# Patient Record
Sex: MALE | Race: WHITE | NOT HISPANIC OR LATINO | Employment: OTHER | URBAN - METROPOLITAN AREA
[De-identification: names, ages, dates, MRNs, and addresses within clinical notes are randomized per-mention and may not be internally consistent; named-entity substitution may affect disease eponyms.]

---

## 2023-04-27 LAB
ALANINE AMINOTRANSFERASE (SGPT) (U/L) IN SER/PLAS: 20 U/L (ref 10–52)
ALBUMIN (G/DL) IN SER/PLAS: 4.3 G/DL (ref 3.4–5)
ALKALINE PHOSPHATASE (U/L) IN SER/PLAS: 85 U/L (ref 33–136)
ANION GAP IN SER/PLAS: 12 MMOL/L (ref 10–20)
ASPARTATE AMINOTRANSFERASE (SGOT) (U/L) IN SER/PLAS: 20 U/L (ref 9–39)
BILIRUBIN TOTAL (MG/DL) IN SER/PLAS: 1 MG/DL (ref 0–1.2)
CALCIUM (MG/DL) IN SER/PLAS: 9.6 MG/DL (ref 8.6–10.3)
CARBON DIOXIDE, TOTAL (MMOL/L) IN SER/PLAS: 29 MMOL/L (ref 21–32)
CHLORIDE (MMOL/L) IN SER/PLAS: 102 MMOL/L (ref 98–107)
CHOLESTEROL (MG/DL) IN SER/PLAS: 157 MG/DL (ref 0–199)
CHOLESTEROL IN HDL (MG/DL) IN SER/PLAS: 48.9 MG/DL
CHOLESTEROL/HDL RATIO: 3.2
CREATININE (MG/DL) IN SER/PLAS: 1.07 MG/DL (ref 0.5–1.3)
GFR MALE: 73 ML/MIN/1.73M2
GLUCOSE (MG/DL) IN SER/PLAS: 88 MG/DL (ref 74–99)
LDL: 84 MG/DL (ref 0–99)
POTASSIUM (MMOL/L) IN SER/PLAS: 4.7 MMOL/L (ref 3.5–5.3)
PROTEIN TOTAL: 7.3 G/DL (ref 6.4–8.2)
SODIUM (MMOL/L) IN SER/PLAS: 138 MMOL/L (ref 136–145)
TRIGLYCERIDE (MG/DL) IN SER/PLAS: 122 MG/DL (ref 0–149)
UREA NITROGEN (MG/DL) IN SER/PLAS: 17 MG/DL (ref 6–23)
VLDL: 24 MG/DL (ref 0–40)

## 2023-05-01 PROBLEM — R73.02 IGT (IMPAIRED GLUCOSE TOLERANCE): Status: ACTIVE | Noted: 2023-05-01

## 2023-05-01 PROBLEM — G47.33 OBSTRUCTIVE SLEEP APNEA, ADULT: Status: ACTIVE | Noted: 2023-05-01

## 2023-05-01 PROBLEM — F32.5 MAJOR DEPRESSIVE DISORDER WITH SINGLE EPISODE, IN FULL REMISSION (CMS-HCC): Status: ACTIVE | Noted: 2023-05-01

## 2023-05-01 PROBLEM — H81.11 BENIGN PAROXYSMAL POSITIONAL VERTIGO OF RIGHT EAR: Status: ACTIVE | Noted: 2023-05-01

## 2023-05-01 PROBLEM — I49.5 SINUS NODE DYSFUNCTION (MULTI): Status: ACTIVE | Noted: 2023-05-01

## 2023-05-01 PROBLEM — N62 SUBAREOLAR GYNECOMASTIA IN MALE: Status: ACTIVE | Noted: 2023-05-01

## 2023-05-01 PROBLEM — N21.0 CALCIUM STONE OF BLADDER: Status: ACTIVE | Noted: 2023-05-01

## 2023-05-01 PROBLEM — E78.5 DYSLIPIDEMIA, GOAL LDL BELOW 70: Status: ACTIVE | Noted: 2023-05-01

## 2023-05-01 PROBLEM — F51.04 CHRONIC INSOMNIA: Status: ACTIVE | Noted: 2023-05-01

## 2023-05-01 PROBLEM — H81.90 VESTIBULOPATHY: Status: ACTIVE | Noted: 2023-05-01

## 2023-05-01 PROBLEM — E55.9 VITAMIN D DEFICIENCY: Status: ACTIVE | Noted: 2023-05-01

## 2023-05-01 PROBLEM — R39.15 BENIGN PROSTATIC HYPERPLASIA (BPH) WITH URINARY URGENCY: Status: ACTIVE | Noted: 2023-05-01

## 2023-05-01 PROBLEM — M17.0 PRIMARY OSTEOARTHRITIS OF BOTH KNEES: Status: ACTIVE | Noted: 2023-05-01

## 2023-05-01 PROBLEM — I87.2 VENOUS INSUFFICIENCY (CHRONIC) (PERIPHERAL): Status: ACTIVE | Noted: 2023-05-01

## 2023-05-01 PROBLEM — M1A.00X0 CHRONIC GOUTY ARTHROPATHY: Status: ACTIVE | Noted: 2023-05-01

## 2023-05-01 PROBLEM — D86.9 SARCOIDOSIS: Status: ACTIVE | Noted: 2023-05-01

## 2023-05-01 PROBLEM — E87.8 DISEQUILIBRIUM SYNDROME: Status: ACTIVE | Noted: 2023-05-01

## 2023-05-01 PROBLEM — E66.9 OBESITY WITH BODY MASS INDEX 30 OR GREATER: Status: ACTIVE | Noted: 2023-05-01

## 2023-05-01 PROBLEM — H90.3 SENSORINEURAL HEARING LOSS (SNHL) OF BOTH EARS: Status: ACTIVE | Noted: 2023-05-01

## 2023-05-01 PROBLEM — G47.33 OSA ON CPAP: Status: ACTIVE | Noted: 2023-05-01

## 2023-05-01 PROBLEM — N40.1 BENIGN PROSTATIC HYPERPLASIA (BPH) WITH URINARY URGENCY: Status: ACTIVE | Noted: 2023-05-01

## 2023-05-01 PROBLEM — M1A.9XX0 CHRONIC GOUTY ARTHROPATHY: Status: ACTIVE | Noted: 2023-05-01

## 2023-05-01 PROBLEM — I48.0 PAROXYSMAL ATRIAL FIBRILLATION (MULTI): Status: ACTIVE | Noted: 2023-05-01

## 2023-05-01 PROBLEM — G25.2 ACTION TREMOR: Status: ACTIVE | Noted: 2023-05-01

## 2023-05-01 PROBLEM — G25.81 RESTLESS LEGS SYNDROME: Status: ACTIVE | Noted: 2023-05-01

## 2023-05-01 RX ORDER — PRAMIPEXOLE DIHYDROCHLORIDE 0.5 MG/1
1 TABLET ORAL NIGHTLY
COMMUNITY
Start: 2021-10-18 | End: 2023-05-02 | Stop reason: SDUPTHER

## 2023-05-01 RX ORDER — CHOLECALCIFEROL (VITAMIN D3) 25 MCG
25 TABLET ORAL DAILY
COMMUNITY

## 2023-05-01 RX ORDER — ALLOPURINOL 100 MG/1
1 TABLET ORAL DAILY
COMMUNITY
End: 2023-05-01

## 2023-05-01 RX ORDER — ATORVASTATIN CALCIUM 20 MG/1
1 TABLET, FILM COATED ORAL DAILY
COMMUNITY
Start: 2021-02-11 | End: 2023-05-02 | Stop reason: SDUPTHER

## 2023-05-01 RX ORDER — TERAZOSIN 10 MG/1
10 CAPSULE ORAL NIGHTLY
COMMUNITY
End: 2023-05-01

## 2023-05-01 RX ORDER — ESCITALOPRAM OXALATE 10 MG/1
10 TABLET ORAL DAILY
COMMUNITY
End: 2023-05-01

## 2023-05-01 RX ORDER — TRAZODONE HYDROCHLORIDE 100 MG/1
100 TABLET ORAL NIGHTLY
COMMUNITY
Start: 2020-08-19 | End: 2023-05-02 | Stop reason: SDUPTHER

## 2023-05-02 ENCOUNTER — OFFICE VISIT (OUTPATIENT)
Dept: PRIMARY CARE | Facility: CLINIC | Age: 73
End: 2023-05-02
Payer: MEDICARE

## 2023-05-02 VITALS
DIASTOLIC BLOOD PRESSURE: 70 MMHG | WEIGHT: 251 LBS | HEIGHT: 68 IN | BODY MASS INDEX: 38.04 KG/M2 | SYSTOLIC BLOOD PRESSURE: 112 MMHG | HEART RATE: 68 BPM

## 2023-05-02 DIAGNOSIS — G47.33 OSA ON CPAP: ICD-10-CM

## 2023-05-02 DIAGNOSIS — F51.04 CHRONIC INSOMNIA: ICD-10-CM

## 2023-05-02 DIAGNOSIS — H81.93 VESTIBULOPATHY OF BOTH EARS: ICD-10-CM

## 2023-05-02 DIAGNOSIS — H81.11 BENIGN PAROXYSMAL POSITIONAL VERTIGO OF RIGHT EAR: ICD-10-CM

## 2023-05-02 DIAGNOSIS — E78.5 DYSLIPIDEMIA, GOAL LDL BELOW 70: Primary | ICD-10-CM

## 2023-05-02 DIAGNOSIS — I48.0 PAROXYSMAL ATRIAL FIBRILLATION (MULTI): ICD-10-CM

## 2023-05-02 DIAGNOSIS — E66.01 CLASS 2 SEVERE OBESITY DUE TO EXCESS CALORIES WITH SERIOUS COMORBIDITY AND BODY MASS INDEX (BMI) OF 38.0 TO 38.9 IN ADULT (MULTI): ICD-10-CM

## 2023-05-02 DIAGNOSIS — I49.5 SINUS NODE DYSFUNCTION (MULTI): ICD-10-CM

## 2023-05-02 DIAGNOSIS — F32.5 MAJOR DEPRESSIVE DISORDER WITH SINGLE EPISODE, IN FULL REMISSION (CMS-HCC): ICD-10-CM

## 2023-05-02 DIAGNOSIS — G25.81 RESTLESS LEGS SYNDROME: ICD-10-CM

## 2023-05-02 DIAGNOSIS — Z12.11 COLON CANCER SCREENING: ICD-10-CM

## 2023-05-02 PROBLEM — E87.8 DISEQUILIBRIUM SYNDROME: Status: RESOLVED | Noted: 2023-05-01 | Resolved: 2023-05-02

## 2023-05-02 PROBLEM — E66.9 OBESITY WITH BODY MASS INDEX 30 OR GREATER: Status: RESOLVED | Noted: 2023-05-01 | Resolved: 2023-05-02

## 2023-05-02 PROBLEM — E66.812 CLASS 2 SEVERE OBESITY DUE TO EXCESS CALORIES WITH SERIOUS COMORBIDITY AND BODY MASS INDEX (BMI) OF 38.0 TO 38.9 IN ADULT: Status: ACTIVE | Noted: 2023-05-02

## 2023-05-02 PROCEDURE — 99215 OFFICE O/P EST HI 40 MIN: CPT | Performed by: INTERNAL MEDICINE

## 2023-05-02 PROCEDURE — 3011F LIPID PANEL DOC REV: CPT | Performed by: INTERNAL MEDICINE

## 2023-05-02 PROCEDURE — 1036F TOBACCO NON-USER: CPT | Performed by: INTERNAL MEDICINE

## 2023-05-02 PROCEDURE — 1160F RVW MEDS BY RX/DR IN RCRD: CPT | Performed by: INTERNAL MEDICINE

## 2023-05-02 PROCEDURE — 1159F MED LIST DOCD IN RCRD: CPT | Performed by: INTERNAL MEDICINE

## 2023-05-02 PROCEDURE — 3008F BODY MASS INDEX DOCD: CPT | Performed by: INTERNAL MEDICINE

## 2023-05-02 RX ORDER — FLUOXETINE HYDROCHLORIDE 40 MG/1
40 CAPSULE ORAL DAILY
Qty: 90 CAPSULE | Refills: 3 | Status: SHIPPED | OUTPATIENT
Start: 2023-05-02 | End: 2024-01-22 | Stop reason: SDUPTHER

## 2023-05-02 RX ORDER — ATORVASTATIN CALCIUM 20 MG/1
20 TABLET, FILM COATED ORAL DAILY
Qty: 90 TABLET | Refills: 3 | Status: SHIPPED | OUTPATIENT
Start: 2023-05-02 | End: 2024-01-22 | Stop reason: SDUPTHER

## 2023-05-02 RX ORDER — DULAGLUTIDE 0.75 MG/.5ML
0.75 INJECTION, SOLUTION SUBCUTANEOUS
Qty: 4 EACH | Refills: 11 | Status: SHIPPED | OUTPATIENT
Start: 2023-05-02 | End: 2024-01-22 | Stop reason: SDUPTHER

## 2023-05-02 RX ORDER — TRAZODONE HYDROCHLORIDE 100 MG/1
100 TABLET ORAL NIGHTLY
Qty: 90 TABLET | Refills: 3 | Status: SHIPPED | OUTPATIENT
Start: 2023-05-02 | End: 2024-01-22 | Stop reason: SDUPTHER

## 2023-05-02 RX ORDER — PRAMIPEXOLE DIHYDROCHLORIDE 0.5 MG/1
0.5 TABLET ORAL NIGHTLY
Qty: 90 TABLET | Refills: 3 | Status: SHIPPED | OUTPATIENT
Start: 2023-05-02 | End: 2024-01-22 | Stop reason: SDUPTHER

## 2023-05-02 ASSESSMENT — PATIENT HEALTH QUESTIONNAIRE - PHQ9
1. LITTLE INTEREST OR PLEASURE IN DOING THINGS: NOT AT ALL
SUM OF ALL RESPONSES TO PHQ9 QUESTIONS 1 AND 2: 0
2. FEELING DOWN, DEPRESSED OR HOPELESS: NOT AT ALL

## 2023-05-02 ASSESSMENT — ANXIETY QUESTIONNAIRES
4. TROUBLE RELAXING: NOT AT ALL
IF YOU CHECKED OFF ANY PROBLEMS ON THIS QUESTIONNAIRE, HOW DIFFICULT HAVE THESE PROBLEMS MADE IT FOR YOU TO DO YOUR WORK, TAKE CARE OF THINGS AT HOME, OR GET ALONG WITH OTHER PEOPLE: NOT DIFFICULT AT ALL
7. FEELING AFRAID AS IF SOMETHING AWFUL MIGHT HAPPEN: NOT AT ALL
2. NOT BEING ABLE TO STOP OR CONTROL WORRYING: NOT AT ALL
3. WORRYING TOO MUCH ABOUT DIFFERENT THINGS: NOT AT ALL
GAD7 TOTAL SCORE: 0
6. BECOMING EASILY ANNOYED OR IRRITABLE: NOT AT ALL
1. FEELING NERVOUS, ANXIOUS, OR ON EDGE: NOT AT ALL
5. BEING SO RESTLESS THAT IT IS HARD TO SIT STILL: NOT AT ALL

## 2023-05-02 ASSESSMENT — ENCOUNTER SYMPTOMS
LOSS OF SENSATION IN FEET: 0
OCCASIONAL FEELINGS OF UNSTEADINESS: 0
DEPRESSION: 0

## 2023-05-02 NOTE — ASSESSMENT & PLAN NOTE
Continue exercises for nystagmus evaluated by neuro-ophthalmologist and neuro audiologist response to vestibular rehabilitation and exercises consider reevaluation with vestibular rehab as outpatient

## 2023-05-02 NOTE — PROGRESS NOTES
"Subjective   Reason for Visit: Wesley Chilel is an 72 y.o. male here for a FU  visit.     Past Medical, Surgical, and Family History reviewed and updated in chart.    Reviewed all medications by prescribing practitioner or clinical pharmacist (such as prescriptions, OTCs, herbal therapies and supplements) and documented in the medical record.    HPI    Patient Care Team:  Hany Penaloza DO as PCP - General     Review of Systems    Objective   Vitals:  /70   Pulse 68   Ht 1.727 m (5' 8\")   Wt 114 kg (251 lb)   BMI 38.16 kg/m²       Physical Exam    Assessment/Plan   Problem List Items Addressed This Visit          Nervous    Chronic insomnia    Current Assessment & Plan     Stable on trazodone 100 mg daily         Relevant Medications    traZODone (Desyrel) 100 mg tablet    dulaglutide (Trulicity) 0.75 mg/0.5 mL pen injector    zoster vaccine-recombinant adjuvanted (Shingrix) 50 mcg/0.5 mL vaccine    Other Relevant Orders    Fecal Occult Blood Immunoassay    ANNA on CPAP    Current Assessment & Plan     Sleep test completed October 8, 2021 underwent titration at [facility] severe sleep apnea syndrome controlled with CPAP at 14 cm of water pressure also with concomitant periodic limb movement disorder treated with pramipexole 0.5 mg nightly patient adherent to regular use continue         Restless legs syndrome    Current Assessment & Plan     Continue pramipexole 0.5 mg nightly         Relevant Medications    pramipexole (Mirapex) 0.5 mg tablet    dulaglutide (Trulicity) 0.75 mg/0.5 mL pen injector    zoster vaccine-recombinant adjuvanted (Shingrix) 50 mcg/0.5 mL vaccine    Other Relevant Orders    Fecal Occult Blood Immunoassay       Circulatory    Paroxysmal atrial fibrillation (CMS/HCC)    Current Assessment & Plan     Heart rate stable normal sinus rhythm continue rivaroxaban 20 mg daily for stroke prophylaxis         Relevant Medications    rivaroxaban (Xarelto) 20 mg tablet    " dulaglutide (Trulicity) 0.75 mg/0.5 mL pen injector    zoster vaccine-recombinant adjuvanted (Shingrix) 50 mcg/0.5 mL vaccine    Other Relevant Orders    Fecal Occult Blood Immunoassay    Sinus node dysfunction (CMS/[facility])    Current Assessment & Plan     Stable at this time            Endocrine/Metabolic    Class 2 severe obesity due to excess calories with serious comorbidity and body mass index (BMI) of 38.0 to 38.9 in adult (CMS/[facility])    Current Assessment & Plan     Patient interested in GLP-1 agonist therapy for treatment of morbid obesity with stroke risk factors and comorbid illness with BMI of 38 continues to work at reduction in diet with exercise to control cardiovascular risk improvement seen in impaired fasting sugars            Other    Benign paroxysmal positional vertigo of right ear    Current Assessment & Plan     Continue exercises for nystagmus evaluated by neuro-ophthalmologist and neuro audiologist response to vestibular rehabilitation and exercises consider reevaluation with vestibular rehab as outpatient         Dyslipidemia, goal LDL below 70 - Primary    Current Assessment & Plan     Optimal lipid management on atorvastatin 20 mg daily         Relevant Medications    atorvastatin (Lipitor) 20 mg tablet    Major depressive disorder with single episode, in full remission (CMS/[facility])    Current Assessment & Plan     Good response seen with addition of fluoxetine 20 mg daily will increase to 40 mg daily for further optimization and reevaluate next visit         Relevant Medications    FLUoxetine (PROzac) 40 mg capsule    dulaglutide (Trulicity) 0.75 mg/0.5 mL pen injector    zoster vaccine-recombinant adjuvanted (Shingrix) 50 mcg/0.5 mL vaccine    Other Relevant Orders    Fecal Occult Blood Immunoassay    Vestibulopathy    Current Assessment & Plan     Continue treatment of vestibulopathy patient is unsafe to travel by train and participate in travel activities due to the severity of gait balance  and disequilibrium related to his inner ear pathology a letter was written excusing him from travel with assessment today         Colon cancer screening    Current Assessment & Plan     Fecal testing for annual screening colonoscopy completed years ago         Relevant Orders    Fecal Occult Blood Immunoassay

## 2023-05-02 NOTE — ASSESSMENT & PLAN NOTE
Patient interested in GLP-1 agonist therapy for treatment of morbid obesity with stroke risk factors and comorbid illness with BMI of 38 continues to work at reduction in diet with exercise to control cardiovascular risk improvement seen in impaired fasting sugars

## 2023-05-02 NOTE — ASSESSMENT & PLAN NOTE
Good response seen with addition of fluoxetine 20 mg daily will increase to 40 mg daily for further optimization and reevaluate next visit

## 2023-05-02 NOTE — ASSESSMENT & PLAN NOTE
Continue treatment of vestibulopathy patient is unsafe to travel by train and participate in travel activities due to the severity of gait balance and disequilibrium related to his inner ear pathology a letter was written excusing him from travel with assessment today

## 2023-05-02 NOTE — ASSESSMENT & PLAN NOTE
Sleep test completed October 8, 2021 underwent titration at [facility] severe sleep apnea syndrome controlled with CPAP at 14 cm of water pressure also with concomitant periodic limb movement disorder treated with pramipexole 0.5 mg nightly patient adherent to regular use continue

## 2023-05-02 NOTE — PROGRESS NOTES
"Subjective   Patient ID: Wesley Chilel is a 72 y.o. male who presents for Follow-up.    HPI     Review of Systems    Objective   Ht 1.727 m (5' 8\")   Wt 114 kg (251 lb)   BMI 38.16 kg/m²     Physical Exam    Assessment/Plan          "

## 2023-05-10 ENCOUNTER — TELEMEDICINE (OUTPATIENT)
Dept: PHARMACY | Facility: HOSPITAL | Age: 73
End: 2023-05-10
Payer: MEDICARE

## 2023-05-10 DIAGNOSIS — F32.5 MAJOR DEPRESSIVE DISORDER WITH SINGLE EPISODE, IN FULL REMISSION (CMS-HCC): ICD-10-CM

## 2023-05-10 DIAGNOSIS — E78.5 DYSLIPIDEMIA, GOAL LDL BELOW 70: ICD-10-CM

## 2023-05-10 DIAGNOSIS — H81.93 VESTIBULOPATHY OF BOTH EARS: ICD-10-CM

## 2023-05-10 DIAGNOSIS — E66.01 CLASS 2 SEVERE OBESITY DUE TO EXCESS CALORIES WITH SERIOUS COMORBIDITY AND BODY MASS INDEX (BMI) OF 38.0 TO 38.9 IN ADULT (MULTI): ICD-10-CM

## 2023-05-10 DIAGNOSIS — G25.81 RESTLESS LEGS SYNDROME: ICD-10-CM

## 2023-05-10 DIAGNOSIS — I49.5 SINUS NODE DYSFUNCTION (MULTI): ICD-10-CM

## 2023-05-10 DIAGNOSIS — G47.33 OSA ON CPAP: ICD-10-CM

## 2023-05-10 DIAGNOSIS — H81.11 BENIGN PAROXYSMAL POSITIONAL VERTIGO OF RIGHT EAR: ICD-10-CM

## 2023-05-10 DIAGNOSIS — I48.0 PAROXYSMAL ATRIAL FIBRILLATION (MULTI): ICD-10-CM

## 2023-05-10 DIAGNOSIS — Z12.11 COLON CANCER SCREENING: ICD-10-CM

## 2023-05-10 DIAGNOSIS — F51.04 CHRONIC INSOMNIA: ICD-10-CM

## 2023-05-10 NOTE — PROGRESS NOTES
Subjective   Patient ID: Wesley Chilel is a 72 y.o. male who presents for Obesity (Trulicity covered but $200/month; discussed  PAP program and will send paperwork. Will follow-up with patient in 1 week to review paperwork with wife. ).        Objective   There were no vitals taken for this visit.        Assessment/Plan   Diagnoses and all orders for this visit:  Dyslipidemia, goal LDL below 70  -     Follow Up In [facility] - Pharmacy  Paroxysmal atrial fibrillation (CMS/HCC)  -     Follow Up In [facility] - Pharmacy  Major depressive disorder with single episode, in full remission (CMS/[facility])  -     Follow Up In [facility] - Pharmacy  Chronic insomnia  -     Follow Up In [facility] - Pharmacy  Restless legs syndrome  -     Follow Up In [facility] - Pharmacy  Colon cancer screening  -     Follow Up In [facility] - Pharmacy  Class 2 severe obesity due to excess calories with serious comorbidity and body mass index (BMI) of 38.0 to 38.9 in adult (CMS/[facility])  -     Follow Up In [facility] - Pharmacy  ANNA on CPAP  -     Follow Up In [facility] - Pharmacy  Vestibulopathy of both ears  -     Follow Up In [facility] - Pharmacy  Benign paroxysmal positional vertigo of right ear  -     Follow Up In [facility] - Pharmacy  Sinus node dysfunction (CMS/[facility])  -     Follow Up In [facility] - Pharmacy    Plan:   Follow-up in 1 week to review  PAP paperwork.   Start Trulicity 0.75 mg weekly (in-possession hasn't started).

## 2023-05-10 NOTE — PATIENT INSTRUCTIONS
Completed in person Diabetic Education for the administration of once-weekly Trulicity:    1. Instructed patient that Trulicity must be kept refrigerated, if necessary a pen may be kept at room temperature for up to 14 days.  2. Remove the Pen from the refrigerator. Leave the base cap on until you are ready to inject.  3. Check the Pen label to make sure you have the right medicine and it has not . Additionally, ensure that the solution is not cloudy, discolored, or has particles in it.  4. Prior to administration, wash and rinse hands.   5.Next, choose your injection site (You may inject into your abdomen or thigh; Another person may give you the injection in your upper arm)  6. Change (rotate) your injection site each week. You may use the same area of your body, but be sure to choose a different injection site in that area.  7. Once administration site has been selected, use a new alcohol swab to sanitize the administration site and allow to air dry.  8. First, make sure the pen is in the locked position. While still in the locked position remove the base cap (gray cap) and dispose into a regular trash. Do not attempt to put the base cap back on, this may damage the needle.  9. Place the Clear Base flat and firmly against your skin at the injection site.  10. Press and hold the green Injection Button. You will hear a loud click. Continue holding the Clear Base firmly against your skin until you hear a second click. Do not rub the area after administration.  11. Dispose of the pen in a sharps container (Coffee can, laundry detergent bottle)  12. Injections will should be done on the same day every week, if you forget you have up to 3 days to remember to do your next dose. If less than 3 days remain before the next scheduled dose, skip the missed dose and administer the next dose on the regularly scheduled day.

## 2023-05-17 ENCOUNTER — TELEMEDICINE (OUTPATIENT)
Dept: PHARMACY | Facility: HOSPITAL | Age: 73
End: 2023-05-17
Payer: MEDICARE

## 2023-05-17 DIAGNOSIS — E66.01 CLASS 2 SEVERE OBESITY DUE TO EXCESS CALORIES WITH SERIOUS COMORBIDITY AND BODY MASS INDEX (BMI) OF 38.0 TO 38.9 IN ADULT (MULTI): ICD-10-CM

## 2023-05-17 NOTE — PROGRESS NOTES
Pharmacy Clinic Note    Wesley Chilel is a 72 y.o. male was referred to Clinical Pharmacy Team for weight management.    Referring Provider: Hany Penaloza DO    Subjective   Allergies   Allergen Reactions    Bee Venom Protein (Honey Bee) Swelling    Penicillins Swelling       [id number] - [facility] [address] [facility] [address]  [address]  [facility] [address]  Phone: [phone] Fax: [phone]      Objective     There were no vitals taken for this visit.     LAB  Lab Results   Component Value Date    BILITOT 1.0 04/27/2023    CALCIUM 9.6 04/27/2023    CO2 29 04/27/2023     04/27/2023    CREATININE 1.07 04/27/2023    GLUCOSE 88 04/27/2023    ALKPHOS 85 04/27/2023    K 4.7 04/27/2023    PROT 7.3 04/27/2023     04/27/2023    AST 20 04/27/2023    ALT 20 04/27/2023    BUN 17 04/27/2023    ANIONGAP 12 04/27/2023    MG 1.67 11/03/2020    PHOS 3.7 10/26/2021    ALBUMIN 4.3 04/27/2023    GFRMALE 73 04/27/2023     Lab Results   Component Value Date    TRIG 122 04/27/2023    CHOL 157 04/27/2023    HDL 48.9 04/27/2023     Lab Results   Component Value Date    HGBA1C 5.3 12/14/2022       Current Outpatient Medications on File Prior to Visit   Medication Sig Dispense Refill    atorvastatin (Lipitor) 20 mg tablet Take 1 tablet (20 mg) by mouth once daily. 90 tablet 3    cholecalciferol (Vitamin D-3) 25 MCG (1000 UT) tablet Take 1 tablet (25 mcg) by mouth once daily.      dulaglutide (Trulicity) 0.75 mg/0.5 mL pen injector Inject 0.75 mg under the skin 1 (one) time per week. 4 each 11    FLUoxetine (PROzac) 40 mg capsule Take 1 capsule (40 mg) by mouth once daily. 90 capsule 3    pramipexole (Mirapex) 0.5 mg tablet Take 1 tablet (0.5 mg) by mouth once daily at bedtime. 90 tablet 3    rivaroxaban (Xarelto) 20 mg tablet Take 1 tablet (20 mg) by mouth once daily in the evening. Take with meals. Take with food. 90 tablet 3    traZODone (Desyrel) 100 mg  tablet Take 1 tablet (100 mg) by mouth once daily at bedtime. 90 tablet 3     No current facility-administered medications on file prior to visit.      DRUG INTERACTIONS  - No significant drug-drug interactions exist that require change in therapy  _______________________________________________________________________  PATIENT EDUCATION/GOALS    1. Per Dr. Penaloza, patient received Trulicity 0.75 mg for off-label use for weight loss from retail pharmacy but will not be able to afford medication long term. Discussed with patient's wife that we may be able to enroll patient in  PAP for Trulicity pending updated 1040 and paid claim. Pt wife to bring 2020 1040 form to office on 5/17/23 and we will send a new prescription for Trulicity to our  pharmacy in order to receive a paid claim.    2. If  PAP approved, will also assess potential to enrol Xarelto in  PAP in 2.5 months (90 day refill just picked up).  _______________________________________________________________________    Assessment/Plan   Problem List Items Addressed This Visit          Endocrine/Metabolic    Class 2 severe obesity due to excess calories with serious comorbidity and body mass index (BMI) of 38.0 to 38.9 in adult (CMS/[facility])        Continue all meds under the continuation of care with the referring provider and clinical pharmacy team.    Clinical Pharmacist follow-up: 5/24/23    Hai Farr, PharmFAITH     Verbal consent to manage patient's drug therapy was obtained from [the patient and/or an individual authorized to act on behalf of a patient]. They were informed they may decline to participate or withdraw from participation in pharmacy services at any time.

## 2023-05-30 ENCOUNTER — TELEPHONE (OUTPATIENT)
Dept: PRIMARY CARE | Facility: CLINIC | Age: 73
End: 2023-05-30

## 2023-05-30 NOTE — TELEPHONE ENCOUNTER
Patient had an order for a Fecal Occult Blood and it . He wants to know if new order can be put in and does he need to come into office to pick anything up for this. Thanks!

## 2023-06-05 DIAGNOSIS — Z12.11 COLON CANCER SCREENING: Primary | ICD-10-CM

## 2023-06-06 ENCOUNTER — LAB (OUTPATIENT)
Dept: LAB | Facility: LAB | Age: 73
End: 2023-06-06
Payer: MEDICARE

## 2023-06-06 DIAGNOSIS — Z12.11 COLON CANCER SCREENING: ICD-10-CM

## 2023-06-06 LAB — OCCULT BLOOD, STOOL X1: NORMAL

## 2023-06-06 PROCEDURE — 82274 ASSAY TEST FOR BLOOD FECAL: CPT

## 2023-06-09 LAB — FECAL OCCULT BLD IMMUNOASSAY: NEGATIVE

## 2023-11-03 ENCOUNTER — APPOINTMENT (OUTPATIENT)
Dept: PRIMARY CARE | Facility: CLINIC | Age: 73
End: 2023-11-03
Payer: MEDICARE

## 2023-12-05 DIAGNOSIS — I48.0 PAROXYSMAL ATRIAL FIBRILLATION (MULTI): ICD-10-CM

## 2023-12-05 DIAGNOSIS — E78.5 DYSLIPIDEMIA, GOAL LDL BELOW 70: ICD-10-CM

## 2023-12-05 DIAGNOSIS — E55.9 VITAMIN D DEFICIENCY: ICD-10-CM

## 2023-12-05 DIAGNOSIS — M1A.00X0 CHRONIC GOUTY ARTHROPATHY: Primary | ICD-10-CM

## 2023-12-05 DIAGNOSIS — G25.81 RESTLESS LEGS SYNDROME: ICD-10-CM

## 2023-12-05 DIAGNOSIS — F32.5 MAJOR DEPRESSIVE DISORDER WITH SINGLE EPISODE, IN FULL REMISSION (CMS-HCC): ICD-10-CM

## 2023-12-05 DIAGNOSIS — R73.02 IGT (IMPAIRED GLUCOSE TOLERANCE): ICD-10-CM

## 2024-01-17 ENCOUNTER — LAB (OUTPATIENT)
Dept: LAB | Facility: LAB | Age: 74
End: 2024-01-17
Payer: MEDICARE

## 2024-01-17 DIAGNOSIS — I48.0 PAROXYSMAL ATRIAL FIBRILLATION (MULTI): ICD-10-CM

## 2024-01-17 DIAGNOSIS — M1A.00X0 CHRONIC GOUTY ARTHROPATHY: ICD-10-CM

## 2024-01-17 DIAGNOSIS — E55.9 VITAMIN D DEFICIENCY: ICD-10-CM

## 2024-01-17 DIAGNOSIS — R73.02 IGT (IMPAIRED GLUCOSE TOLERANCE): ICD-10-CM

## 2024-01-17 DIAGNOSIS — F32.5 MAJOR DEPRESSIVE DISORDER WITH SINGLE EPISODE, IN FULL REMISSION (CMS-HCC): ICD-10-CM

## 2024-01-17 DIAGNOSIS — E78.5 DYSLIPIDEMIA, GOAL LDL BELOW 70: ICD-10-CM

## 2024-01-17 DIAGNOSIS — G25.81 RESTLESS LEGS SYNDROME: ICD-10-CM

## 2024-01-17 LAB
25(OH)D3 SERPL-MCNC: 28 NG/ML (ref 30–100)
ALBUMIN SERPL BCP-MCNC: 4 G/DL (ref 3.4–5)
ALP SERPL-CCNC: 71 U/L (ref 33–136)
ALT SERPL W P-5'-P-CCNC: 20 U/L (ref 10–52)
ANION GAP SERPL CALC-SCNC: 11 MMOL/L (ref 10–20)
AST SERPL W P-5'-P-CCNC: 20 U/L (ref 9–39)
BASOPHILS # BLD AUTO: 0.02 X10*3/UL (ref 0–0.1)
BASOPHILS NFR BLD AUTO: 0.3 %
BILIRUB SERPL-MCNC: 0.7 MG/DL (ref 0–1.2)
BUN SERPL-MCNC: 22 MG/DL (ref 6–23)
CALCIUM SERPL-MCNC: 8.8 MG/DL (ref 8.6–10.3)
CHLORIDE SERPL-SCNC: 105 MMOL/L (ref 98–107)
CHOLEST SERPL-MCNC: 179 MG/DL (ref 0–199)
CHOLESTEROL/HDL RATIO: 4
CO2 SERPL-SCNC: 27 MMOL/L (ref 21–32)
CREAT SERPL-MCNC: 1.1 MG/DL (ref 0.5–1.3)
EGFRCR SERPLBLD CKD-EPI 2021: 70 ML/MIN/1.73M*2
EOSINOPHIL # BLD AUTO: 0.17 X10*3/UL (ref 0–0.4)
EOSINOPHIL NFR BLD AUTO: 2.7 %
ERYTHROCYTE [DISTWIDTH] IN BLOOD BY AUTOMATED COUNT: 12.6 % (ref 11.5–14.5)
EST. AVERAGE GLUCOSE BLD GHB EST-MCNC: 100 MG/DL
GLUCOSE SERPL-MCNC: 90 MG/DL (ref 74–99)
HBA1C MFR BLD: 5.1 %
HCT VFR BLD AUTO: 46.1 % (ref 41–52)
HCV AB SER QL: NONREACTIVE
HDLC SERPL-MCNC: 44.3 MG/DL
HGB BLD-MCNC: 15.4 G/DL (ref 13.5–17.5)
IMM GRANULOCYTES # BLD AUTO: 0.03 X10*3/UL (ref 0–0.5)
IMM GRANULOCYTES NFR BLD AUTO: 0.5 % (ref 0–0.9)
LDLC SERPL CALC-MCNC: 99 MG/DL
LYMPHOCYTES # BLD AUTO: 1.64 X10*3/UL (ref 0.8–3)
LYMPHOCYTES NFR BLD AUTO: 26.4 %
MCH RBC QN AUTO: 29.8 PG (ref 26–34)
MCHC RBC AUTO-ENTMCNC: 33.4 G/DL (ref 32–36)
MCV RBC AUTO: 89 FL (ref 80–100)
MONOCYTES # BLD AUTO: 0.54 X10*3/UL (ref 0.05–0.8)
MONOCYTES NFR BLD AUTO: 8.7 %
NEUTROPHILS # BLD AUTO: 3.81 X10*3/UL (ref 1.6–5.5)
NEUTROPHILS NFR BLD AUTO: 61.4 %
NON HDL CHOLESTEROL: 135 MG/DL (ref 0–149)
NRBC BLD-RTO: 0 /100 WBCS (ref 0–0)
PLATELET # BLD AUTO: 194 X10*3/UL (ref 150–450)
POTASSIUM SERPL-SCNC: 4.4 MMOL/L (ref 3.5–5.3)
PROT SERPL-MCNC: 6.3 G/DL (ref 6.4–8.2)
RBC # BLD AUTO: 5.17 X10*6/UL (ref 4.5–5.9)
SODIUM SERPL-SCNC: 139 MMOL/L (ref 136–145)
TRIGL SERPL-MCNC: 177 MG/DL (ref 0–149)
URATE SERPL-MCNC: 6 MG/DL (ref 4–7.5)
VLDL: 35 MG/DL (ref 0–40)
WBC # BLD AUTO: 6.2 X10*3/UL (ref 4.4–11.3)

## 2024-01-17 PROCEDURE — 36415 COLL VENOUS BLD VENIPUNCTURE: CPT

## 2024-01-17 PROCEDURE — 83036 HEMOGLOBIN GLYCOSYLATED A1C: CPT

## 2024-01-17 PROCEDURE — 82306 VITAMIN D 25 HYDROXY: CPT

## 2024-01-17 PROCEDURE — 80061 LIPID PANEL: CPT

## 2024-01-17 PROCEDURE — 85025 COMPLETE CBC W/AUTO DIFF WBC: CPT

## 2024-01-17 PROCEDURE — 80053 COMPREHEN METABOLIC PANEL: CPT

## 2024-01-17 PROCEDURE — 84550 ASSAY OF BLOOD/URIC ACID: CPT

## 2024-01-17 PROCEDURE — 86803 HEPATITIS C AB TEST: CPT

## 2024-01-22 ENCOUNTER — OFFICE VISIT (OUTPATIENT)
Dept: PRIMARY CARE | Facility: CLINIC | Age: 74
End: 2024-01-22
Payer: MEDICARE

## 2024-01-22 VITALS
BODY MASS INDEX: 36.37 KG/M2 | SYSTOLIC BLOOD PRESSURE: 120 MMHG | HEIGHT: 68 IN | HEART RATE: 68 BPM | WEIGHT: 240 LBS | DIASTOLIC BLOOD PRESSURE: 78 MMHG

## 2024-01-22 DIAGNOSIS — I48.0 PAROXYSMAL ATRIAL FIBRILLATION (MULTI): ICD-10-CM

## 2024-01-22 DIAGNOSIS — G47.33 OSA ON CPAP: ICD-10-CM

## 2024-01-22 DIAGNOSIS — F32.5 MAJOR DEPRESSIVE DISORDER WITH SINGLE EPISODE, IN FULL REMISSION (CMS-HCC): ICD-10-CM

## 2024-01-22 DIAGNOSIS — F51.04 CHRONIC INSOMNIA: ICD-10-CM

## 2024-01-22 DIAGNOSIS — E78.5 DYSLIPIDEMIA, GOAL LDL BELOW 70: ICD-10-CM

## 2024-01-22 DIAGNOSIS — R73.02 IGT (IMPAIRED GLUCOSE TOLERANCE): ICD-10-CM

## 2024-01-22 DIAGNOSIS — Z00.00 MEDICARE ANNUAL WELLNESS VISIT, SUBSEQUENT: Primary | ICD-10-CM

## 2024-01-22 DIAGNOSIS — I49.5 SINUS NODE DYSFUNCTION (MULTI): ICD-10-CM

## 2024-01-22 DIAGNOSIS — E55.9 VITAMIN D DEFICIENCY: ICD-10-CM

## 2024-01-22 DIAGNOSIS — Z12.11 COLON CANCER SCREENING: ICD-10-CM

## 2024-01-22 DIAGNOSIS — E66.01 CLASS 2 SEVERE OBESITY DUE TO EXCESS CALORIES WITH SERIOUS COMORBIDITY AND BODY MASS INDEX (BMI) OF 36.0 TO 36.9 IN ADULT (MULTI): ICD-10-CM

## 2024-01-22 DIAGNOSIS — G25.81 RESTLESS LEGS SYNDROME: ICD-10-CM

## 2024-01-22 PROBLEM — H81.90 VESTIBULOPATHY: Status: RESOLVED | Noted: 2023-05-01 | Resolved: 2024-01-22

## 2024-01-22 PROCEDURE — 1159F MED LIST DOCD IN RCRD: CPT | Performed by: INTERNAL MEDICINE

## 2024-01-22 PROCEDURE — 1160F RVW MEDS BY RX/DR IN RCRD: CPT | Performed by: INTERNAL MEDICINE

## 2024-01-22 PROCEDURE — G0442 ANNUAL ALCOHOL SCREEN 15 MIN: HCPCS | Performed by: INTERNAL MEDICINE

## 2024-01-22 PROCEDURE — 99214 OFFICE O/P EST MOD 30 MIN: CPT | Performed by: INTERNAL MEDICINE

## 2024-01-22 PROCEDURE — G0447 BEHAVIOR COUNSEL OBESITY 15M: HCPCS | Performed by: INTERNAL MEDICINE

## 2024-01-22 PROCEDURE — 99497 ADVNCD CARE PLAN 30 MIN: CPT | Performed by: INTERNAL MEDICINE

## 2024-01-22 PROCEDURE — G0444 DEPRESSION SCREEN ANNUAL: HCPCS | Performed by: INTERNAL MEDICINE

## 2024-01-22 PROCEDURE — 3008F BODY MASS INDEX DOCD: CPT | Performed by: INTERNAL MEDICINE

## 2024-01-22 PROCEDURE — G0439 PPPS, SUBSEQ VISIT: HCPCS | Performed by: INTERNAL MEDICINE

## 2024-01-22 PROCEDURE — 1036F TOBACCO NON-USER: CPT | Performed by: INTERNAL MEDICINE

## 2024-01-22 PROCEDURE — G0446 INTENS BEHAVE THER CARDIO DX: HCPCS | Performed by: INTERNAL MEDICINE

## 2024-01-22 PROCEDURE — 1170F FXNL STATUS ASSESSED: CPT | Performed by: INTERNAL MEDICINE

## 2024-01-22 RX ORDER — DULAGLUTIDE 0.75 MG/.5ML
0.75 INJECTION, SOLUTION SUBCUTANEOUS
Qty: 4 EACH | Refills: 11 | Status: SHIPPED | OUTPATIENT
Start: 2024-01-22 | End: 2024-01-22 | Stop reason: WASHOUT

## 2024-01-22 RX ORDER — PRAMIPEXOLE DIHYDROCHLORIDE 0.5 MG/1
0.5 TABLET ORAL NIGHTLY
Qty: 90 TABLET | Refills: 3 | Status: SHIPPED | OUTPATIENT
Start: 2024-01-22

## 2024-01-22 RX ORDER — DOXEPIN HYDROCHLORIDE 10 MG/1
10 CAPSULE ORAL NIGHTLY
Qty: 90 CAPSULE | Refills: 3 | Status: SHIPPED | OUTPATIENT
Start: 2024-01-22 | End: 2025-01-21

## 2024-01-22 RX ORDER — SEMAGLUTIDE 0.25 MG/.5ML
0.25 INJECTION, SOLUTION SUBCUTANEOUS
Qty: 2 ML | Refills: 11 | Status: SHIPPED | OUTPATIENT
Start: 2024-01-22 | End: 2024-02-01

## 2024-01-22 RX ORDER — TRAZODONE HYDROCHLORIDE 100 MG/1
100 TABLET ORAL NIGHTLY
Qty: 90 TABLET | Refills: 3 | Status: SHIPPED | OUTPATIENT
Start: 2024-01-22 | End: 2024-01-22 | Stop reason: ALTCHOICE

## 2024-01-22 RX ORDER — ATORVASTATIN CALCIUM 20 MG/1
20 TABLET, FILM COATED ORAL DAILY
Qty: 90 TABLET | Refills: 3 | Status: SHIPPED | OUTPATIENT
Start: 2024-01-22 | End: 2024-05-06 | Stop reason: SDUPTHER

## 2024-01-22 RX ORDER — FLUOXETINE HYDROCHLORIDE 40 MG/1
40 CAPSULE ORAL DAILY
Qty: 90 CAPSULE | Refills: 3 | Status: SHIPPED | OUTPATIENT
Start: 2024-01-22 | End: 2025-01-21

## 2024-01-22 RX ORDER — TRAZODONE HYDROCHLORIDE 150 MG/1
150 TABLET ORAL NIGHTLY
Qty: 90 TABLET | Refills: 3 | Status: SHIPPED | OUTPATIENT
Start: 2024-01-22 | End: 2025-01-21

## 2024-01-22 ASSESSMENT — ANXIETY QUESTIONNAIRES
6. BECOMING EASILY ANNOYED OR IRRITABLE: NOT AT ALL
3. WORRYING TOO MUCH ABOUT DIFFERENT THINGS: NOT AT ALL
GAD7 TOTAL SCORE: 0
1. FEELING NERVOUS, ANXIOUS, OR ON EDGE: NOT AT ALL
7. FEELING AFRAID AS IF SOMETHING AWFUL MIGHT HAPPEN: NOT AT ALL
4. TROUBLE RELAXING: NOT AT ALL
IF YOU CHECKED OFF ANY PROBLEMS ON THIS QUESTIONNAIRE, HOW DIFFICULT HAVE THESE PROBLEMS MADE IT FOR YOU TO DO YOUR WORK, TAKE CARE OF THINGS AT HOME, OR GET ALONG WITH OTHER PEOPLE: NOT DIFFICULT AT ALL
5. BEING SO RESTLESS THAT IT IS HARD TO SIT STILL: NOT AT ALL
2. NOT BEING ABLE TO STOP OR CONTROL WORRYING: NOT AT ALL

## 2024-01-22 ASSESSMENT — ACTIVITIES OF DAILY LIVING (ADL)
BATHING: INDEPENDENT
TAKING_MEDICATION: INDEPENDENT
DRESSING: INDEPENDENT
MANAGING_FINANCES: INDEPENDENT
GROCERY_SHOPPING: INDEPENDENT
DOING_HOUSEWORK: INDEPENDENT

## 2024-01-22 ASSESSMENT — ENCOUNTER SYMPTOMS
LOSS OF SENSATION IN FEET: 0
OCCASIONAL FEELINGS OF UNSTEADINESS: 0
DEPRESSION: 0

## 2024-01-22 NOTE — PROGRESS NOTES
"Subjective   Reason for Visit: Wesley Chilel is an 73 y.o. male here for a Medicare Wellness visit.          Reviewed all medications by prescribing practitioner or clinical pharmacist (such as prescriptions, OTCs, herbal therapies and supplements) and documented in the medical record.    HPI    Patient Care Team:  Hany Penaloza DO as PCP - General  Hany Penaloza DO as PCP [facility]     Review of Systems    Objective   Vitals:  Ht 1.727 m (5' 8\")   Wt 109 kg (240 lb)   BMI 36.49 kg/m²       Physical Exam    Assessment/Plan   Problem List Items Addressed This Visit       Benign paroxysmal positional vertigo of right ear    Chronic insomnia    Dyslipidemia, goal LDL below 70    Major depressive disorder with single episode, in full remission (CMS/HCC)    ANNA on CPAP    Paroxysmal atrial fibrillation (CMS/HCC)    Restless legs syndrome    Sinus node dysfunction (CMS/HCC)    Vestibulopathy    Class 2 severe obesity due to excess calories with serious comorbidity and body mass index (BMI) of 38.0 to 38.9 in adult (CMS/HCC)    Colon cancer screening          "

## 2024-01-22 NOTE — ASSESSMENT & PLAN NOTE
Difficulty in getting to sleep chronic will increase trazodone from 100 to 150 mg nightly add doxepin 10 mg nightly consider adding gabapentin encourage patient to discontinue Advil PM and melatonin of no benefit reevaluate next visit

## 2024-01-22 NOTE — ASSESSMENT & PLAN NOTE
Improvement in BMI from 38-36 on Trulicity would like to switch to Wegovy will consult pharmacy for titration reevaluate in 6 months continues with intermittent fasting doing well

## 2024-01-22 NOTE — ASSESSMENT & PLAN NOTE
LDL cholesterol optimal at 99 HDL 44 triglycerides mildly elevated 177 patient to initiate Wegovy continue atorvastatin 20 mg daily

## 2024-01-22 NOTE — PROGRESS NOTES
Alcohol consumption becomes hazardous when consuming women oh over 65 years old greater than 7 drinks per week or greater than 3 drinks per occasion for men greater than 14 drinks per week or greater than 4 drinks per occasion.  I spent 15 minutes screening for alcohol use.Depression and anxiety screening completed   PHQ9 score   GAD7 score   I spent 15 minutes obtaining and discussing depression screening using PHQ 2 questions with results documented in chart.  Screening using PHQ-9 and KEYA-7 scores were used for follow-up with treatment and referral plan discussed.      I spent greater than 15 minutes face-to-face with individual providing recommendations for nutrition choices and exercise plan to help achieve weight reductionI spent 15 minutes face-to-face with this individual discussing the cardiovascular risk and behavioral therapies of nutritional choices exercise and elimination of habits contributing to risk .We agreed on a plan how they may be able to reduce  current cardiovascular risk.  Per patient with calculation greater than 10% aspirin use was discussed and encouraged unless known allergy or increased risk of bleeding contraindicates use patient's 10-year CV risk estimate calculates:I spent greater than 15 minutes discussing advance care planning including the explanation and discussion of advanced directives.  If patient does not have current up-to-date documents examples and information provided on how to create both living will and power of . [facility] toolkit was given to patient and was encouraged to work out completing these documents.Subjective   Reason for Visit: Wesley Chilel is an 73 y.o. male here for a Medicare Wellness visit.     Past Medical, Surgical, and Family History reviewed and updated in chart.    Reviewed all medications by prescribing practitioner or clinical pharmacist (such as prescriptions, OTCs, herbal therapies and supplements) and documented in the medical  "record.    HPI    Patient Care Team:  Hany Penaloza DO as PCP - General  [facility]CP [facility]     Review of Systems    Objective   Vitals:  /78   Pulse 68   Ht 1.727 m (5' 8\")   Wt 109 kg (240 lb)   BMI 36.49 kg/m²       Physical Exam    Assessment/Plan   Problem List Items Addressed This Visit       Chronic insomnia    Current Assessment & Plan     Difficulty in getting to sleep chronic will increase trazodone from 100 to 150 mg nightly add doxepin 10 mg nightly consider adding gabapentin encourage patient to discontinue Advil PM and melatonin of no benefit reevaluate next visit         Relevant Medications    doxepin (SINEquan) 10 mg capsule    traZODone (Desyrel) 150 mg tablet    Other Relevant Orders    Follow Up In [facility] - Pharmacy    Dyslipidemia, goal LDL below 70    Current Assessment & Plan     LDL cholesterol optimal at 99 HDL 44 triglycerides mildly elevated 177 patient to initiate Wegovy continue atorvastatin 20 mg daily         Relevant Medications    atorvastatin (Lipitor) 20 mg tablet    Other Relevant Orders    Follow Up In [facility] - PCP - Established    Comprehensive Metabolic Panel    Hemoglobin A1C    Lipid Panel    Albumin , Urine Random    Vitamin D 25-Hydroxy,Total (for eval of Vitamin D levels)    Fecal Occult Blood Immunoassy    IGT (impaired glucose tolerance)    Current Assessment & Plan     Significant improvement in impaired fasting sugars fasting blood sugar of 90 with a A1c of 5.1 with GLP-1 agonist therapy continue         Relevant Orders    Comprehensive Metabolic Panel    Hemoglobin A1C    Lipid Panel    Albumin , Urine Random    Vitamin D 25-Hydroxy,Total (for eval of Vitamin D levels)    Fecal Occult Blood Immunoassy    Major depressive disorder with single episode, in full remission (CMS/[facility])    Current Assessment & Plan     Continue fluoxetine 40 mg daily in remission         Relevant " Medications    FLUoxetine (PROzac) 40 mg capsule    Other Relevant Orders    Follow Up In [facility] - PCP - Established    ANNA on CPAP    Current Assessment & Plan     Compliant with regular use of CPAP         Relevant Orders    Follow Up In [facility] - PCP - Established    Paroxysmal atrial fibrillation (CMS/[facility])    Current Assessment & Plan     Rate and rhythm controlled continue Xarelto for prevention of stroke         Relevant Medications    rivaroxaban (Xarelto) 20 mg tablet    Other Relevant Orders    Follow Up In [facility] - PCP - Established    Restless legs syndrome    Current Assessment & Plan     Continue pramipexole stable         Relevant Medications    pramipexole (Mirapex) 0.5 mg tablet    Other Relevant Orders    Follow Up In [facility] - PCP - Established    Sinus node dysfunction (CMS/[facility])    Current Assessment & Plan     Stable at this time no reports of symptoms with heart rate         Relevant Orders    Follow Up In [facility] - PCP - Established    Vitamin D deficiency    Current Assessment & Plan     Increase vitamin D supplementation reevaluate next visit         Relevant Orders    Vitamin D 25-Hydroxy,Total (for eval of Vitamin D levels)    Class 2 severe obesity due to excess calories with serious comorbidity and body mass index (BMI) of 36.0 to 36.9 in adult (CMS/[facility])    Current Assessment & Plan     Improvement in BMI from 38-36 on Trulicity would like to switch to Wegovy will consult pharmacy for titration reevaluate in 6 months continues with intermittent fasting doing well         Relevant Medications    semaglutide, weight loss, (Wegovy) 0.25 mg/0.5 mL pen injector    traZODone (Desyrel) 150 mg tablet    Colon cancer screening    Current Assessment & Plan     FIT testing for next visit         Relevant Orders    Fecal Occult Blood Immunoassy    Medicare annual wellness visit, subsequent - Primary    Current Assessment & Plan      Discussed advanced medical directives and fall risk as placed railings in bathroom to assist no recent falls doing well         Relevant Orders    Follow Up In Advanced [facility] Care - PCP - Established

## 2024-01-22 NOTE — ASSESSMENT & PLAN NOTE
Significant improvement in impaired fasting sugars fasting blood sugar of 90 with a A1c of 5.1 with GLP-1 agonist therapy continue

## 2024-01-22 NOTE — ASSESSMENT & PLAN NOTE
Discussed advanced medical directives and fall risk as placed railings in bathroom to assist no recent falls doing well

## 2024-02-01 ENCOUNTER — TELEMEDICINE (OUTPATIENT)
Dept: PHARMACY | Facility: HOSPITAL | Age: 74
End: 2024-02-01
Payer: MEDICARE

## 2024-02-01 DIAGNOSIS — F51.04 CHRONIC INSOMNIA: ICD-10-CM

## 2024-02-01 DIAGNOSIS — E66.01 CLASS 2 SEVERE OBESITY DUE TO EXCESS CALORIES WITH SERIOUS COMORBIDITY AND BODY MASS INDEX (BMI) OF 38.0 TO 38.9 IN ADULT (MULTI): Primary | ICD-10-CM

## 2024-02-01 DIAGNOSIS — E66.01 CLASS 2 SEVERE OBESITY DUE TO EXCESS CALORIES WITH SERIOUS COMORBIDITY AND BODY MASS INDEX (BMI) OF 36.0 TO 36.9 IN ADULT (MULTI): ICD-10-CM

## 2024-02-01 RX ORDER — DULAGLUTIDE 1.5 MG/.5ML
1.5 INJECTION, SOLUTION SUBCUTANEOUS
Qty: 2 ML | Refills: 0 | Status: SHIPPED | OUTPATIENT
Start: 2024-02-01

## 2024-02-01 NOTE — Clinical Note
Wegovy not covered via Medicare. Will restart Trulicity at 1.5 mg and increase monthly (if it is still covered).

## 2024-02-01 NOTE — PROGRESS NOTES
Pharmacy Clinic Note    Wesley Chilel is a 73 y.o. male was referred to Clinical Pharmacy Team for weight management.    Referring Provider: Hany Penaloza DO    Subjective   Allergies   Allergen Reactions    Bee Venom Protein (Honey Bee) Swelling    Penicillins Swelling       [id number] - [facility] [address] AT [facility] [address]  [address]  [facility] [address]  Phone: [phone] Fax: [phone]      Objective     There were no vitals taken for this visit.     LAB  Lab Results   Component Value Date    BILITOT 0.7 01/17/2024    CALCIUM 8.8 01/17/2024    CO2 27 01/17/2024     01/17/2024    CREATININE 1.10 01/17/2024    GLUCOSE 90 01/17/2024    ALKPHOS 71 01/17/2024    K 4.4 01/17/2024    PROT 6.3 (L) 01/17/2024     01/17/2024    AST 20 01/17/2024    ALT 20 01/17/2024    BUN 22 01/17/2024    ANIONGAP 11 01/17/2024    MG 1.67 11/03/2020    PHOS 3.7 10/26/2021    ALBUMIN 4.0 01/17/2024    GFRMALE 73 04/27/2023     Lab Results   Component Value Date    TRIG 177 (H) 01/17/2024    CHOL 179 01/17/2024    LDLCALC 99 01/17/2024    HDL 44.3 01/17/2024     Lab Results   Component Value Date    HGBA1C 5.1 01/17/2024       Current Outpatient Medications on File Prior to Visit   Medication Sig Dispense Refill    atorvastatin (Lipitor) 20 mg tablet Take 1 tablet (20 mg) by mouth once daily. 90 tablet 3    cholecalciferol (Vitamin D-3) 25 MCG (1000 UT) tablet Take 1 tablet (25 mcg) by mouth once daily.      doxepin (SINEquan) 10 mg capsule Take 1 capsule (10 mg) by mouth once daily at bedtime. 90 capsule 3    FLUoxetine (PROzac) 40 mg capsule Take 1 capsule (40 mg) by mouth once daily. 90 capsule 3    pramipexole (Mirapex) 0.5 mg tablet Take 1 tablet (0.5 mg) by mouth once daily at bedtime. 90 tablet 3    rivaroxaban (Xarelto) 20 mg tablet Take 1 tablet (20 mg) by mouth once daily in the evening. Take with meals. Take with food. 90 tablet 3    traZODone  (Desyrel) 150 mg tablet Take 1 tablet (150 mg) by mouth once daily at bedtime. 90 tablet 3    [DISCONTINUED] semaglutide, weight loss, (Wegovy) 0.25 mg/0.5 mL pen injector Inject 0.25 mg under the skin 1 (one) time per week. 2 mL 11     No current facility-administered medications on file prior to visit.      DRUG INTERACTIONS  - No significant drug-drug interactions exist that require change in therapy  _______________________________________________________________________  PATIENT EDUCATION/GOALS    1. Per Dr. Penaloza, patient received Trulicity 0.75 mg for off-label use for weight loss from retail pharmacy for the last 6-8 months and has been tolerating it well. Dr. Penaloza wanted to switch to Wegovy, however, Wegovy is not covered. Therefore, we will continue with Trulicity (increased to 1.5 mg weekly) for as long as the insurance covers it and increase it monthly as tolerated.  _______________________________________________________________________    Assessment/Plan   Problem List Items Addressed This Visit       Chronic insomnia    Class 2 severe obesity due to excess calories with serious comorbidity and body mass index (BMI) of 36.0 to 36.9 in adult (CMS/[facility])     Start Trulicity 1.5 mg once weekly.         Relevant Medications    dulaglutide (Trulicity) 1.5 mg/0.5 mL pen injector injection     Other Visit Diagnoses       Class 2 severe obesity due to excess calories with serious comorbidity and body mass index (BMI) of 38.0 to 38.9 in adult (CMS/[facility])    -  Primary    Relevant Medications    dulaglutide (Trulicity) 1.5 mg/0.5 mL pen injector injection             Continue all meds under the continuation of care with the referring provider and clinical pharmacy team.    Clinical Pharmacist follow-up: monthly    Hai Farr, PharmD     Verbal consent to manage patient's drug therapy was obtained from [the patient and/or an individual authorized to act on behalf of a patient]. They were informed they may  decline to participate or withdraw from participation in pharmacy services at any time.

## 2024-05-06 ENCOUNTER — HOSPITAL ENCOUNTER (OUTPATIENT)
Dept: VASCULAR MEDICINE | Facility: HOSPITAL | Age: 74
Discharge: HOME | End: 2024-05-06
Payer: MEDICARE

## 2024-05-06 ENCOUNTER — OFFICE VISIT (OUTPATIENT)
Dept: CARDIOLOGY | Facility: HOSPITAL | Age: 74
End: 2024-05-06
Payer: MEDICARE

## 2024-05-06 VITALS
OXYGEN SATURATION: 97 % | WEIGHT: 254.41 LBS | HEART RATE: 65 BPM | DIASTOLIC BLOOD PRESSURE: 77 MMHG | BODY MASS INDEX: 38.68 KG/M2 | SYSTOLIC BLOOD PRESSURE: 145 MMHG

## 2024-05-06 DIAGNOSIS — R09.89 BILATERAL CAROTID BRUITS: ICD-10-CM

## 2024-05-06 DIAGNOSIS — I48.0 PAROXYSMAL ATRIAL FIBRILLATION (MULTI): Primary | ICD-10-CM

## 2024-05-06 DIAGNOSIS — E78.5 DYSLIPIDEMIA, GOAL LDL BELOW 70: ICD-10-CM

## 2024-05-06 PROCEDURE — 93005 ELECTROCARDIOGRAM TRACING: CPT | Performed by: NURSE PRACTITIONER

## 2024-05-06 PROCEDURE — 1160F RVW MEDS BY RX/DR IN RCRD: CPT | Performed by: NURSE PRACTITIONER

## 2024-05-06 PROCEDURE — 1159F MED LIST DOCD IN RCRD: CPT | Performed by: NURSE PRACTITIONER

## 2024-05-06 PROCEDURE — 1036F TOBACCO NON-USER: CPT | Performed by: NURSE PRACTITIONER

## 2024-05-06 PROCEDURE — 3008F BODY MASS INDEX DOCD: CPT | Performed by: NURSE PRACTITIONER

## 2024-05-06 PROCEDURE — 99213 OFFICE O/P EST LOW 20 MIN: CPT | Performed by: NURSE PRACTITIONER

## 2024-05-06 PROCEDURE — 93880 EXTRACRANIAL BILAT STUDY: CPT | Performed by: STUDENT IN AN ORGANIZED HEALTH CARE EDUCATION/TRAINING PROGRAM

## 2024-05-06 PROCEDURE — 93880 EXTRACRANIAL BILAT STUDY: CPT

## 2024-05-06 PROCEDURE — 93010 ELECTROCARDIOGRAM REPORT: CPT | Performed by: INTERNAL MEDICINE

## 2024-05-06 RX ORDER — ATORVASTATIN CALCIUM 20 MG/1
20 TABLET, FILM COATED ORAL DAILY
Qty: 90 TABLET | Refills: 3 | Status: SHIPPED | OUTPATIENT
Start: 2024-05-06

## 2024-05-06 ASSESSMENT — ENCOUNTER SYMPTOMS
PSYCHIATRIC NEGATIVE: 1
CONSTITUTIONAL NEGATIVE: 1
GASTROINTESTINAL NEGATIVE: 1
DEPRESSION: 0
ENDOCRINE NEGATIVE: 1
HEMATOLOGIC/LYMPHATIC NEGATIVE: 1
RESPIRATORY NEGATIVE: 1
EYES NEGATIVE: 1
CARDIOVASCULAR NEGATIVE: 1
NEUROLOGICAL NEGATIVE: 1
MYALGIAS: 1

## 2024-05-06 NOTE — PROGRESS NOTES
Referred by Dr. Armendariz for Follow-up (1 yr.)     History Of Present Illness:    Wesley Chilel is a very pleasant 73 year old gentleman with a history of HTN, PAF (Eliquis) and ANNA, he is here for a follow up visit. The patient is seen in collaboration with Dr. Brown. Mr. Chilel states overall he has been feeling well. He denies chest pain, shortness of breath or heart palpitations. Continues to stay active.    Review of Systems   Constitutional: Negative.   HENT: Negative.     Eyes: Negative.    Cardiovascular: Negative.    Respiratory: Negative.     Endocrine: Negative.    Hematologic/Lymphatic: Negative.    Skin: Negative.    Musculoskeletal:  Positive for muscle weakness and myalgias.   Gastrointestinal: Negative.    Neurological: Negative.    Psychiatric/Behavioral: Negative.        Past Medical History:  He has a past medical history of Abnormal findings on diagnostic imaging of liver and biliary tract (07/14/2021), Abnormal reflex (10/21/2021), Benign paroxysmal vertigo, right ear (06/23/2022), Body mass index (BMI) 35.0-35.9, adult (11/30/2021), Body mass index (BMI) 36.0-36.9, adult (06/23/2022), Body mass index (BMI) 37.0-37.9, adult (07/19/2021), Encounter for other preprocedural examination (10/26/2021), Encounter for preprocedural cardiovascular examination (05/10/2021), Idiopathic chronic gout, unspecified site, without tophus (tophi) (02/11/2021), Impacted cerumen, right ear (03/24/2021), Labyrinthine dysfunction, unspecified ear (08/16/2021), Morbid (severe) obesity due to excess calories (Multi) (12/14/2022), Morbid (severe) obesity due to excess calories (Multi) (08/23/2021), Obstructive sleep apnea (adult) (pediatric) (06/23/2022), Other conditions influencing health status (06/23/2022), Other disorders of electrolyte and fluid balance, not elsewhere classified (11/16/2021), Other specified crystal arthropathies, unspecified site (09/15/2020), Other specified disorders of eustachian tube,  bilateral (07/19/2021), Other specified disorders of thyroid (10/01/2021), Pain in left hand (08/17/2020), Pain in unspecified elbow (11/09/2022), Personal history of other diseases of the circulatory system (11/12/2020), Personal history of other diseases of the circulatory system (11/10/2020), Personal history of other diseases of the circulatory system (08/19/2021), Personal history of other diseases of the musculoskeletal system and connective tissue (11/09/2022), Personal history of other diseases of the musculoskeletal system and connective tissue (11/09/2022), Personal history of other diseases of the nervous system and sense organs, Personal history of other diseases of urinary system (09/13/2021), Personal history of other specified conditions (02/05/2018), Personal history of other specified conditions (07/19/2021), Personal history of other specified conditions (12/14/2022), Personal history of other specified conditions (11/30/2020), Personal history of other specified conditions (11/16/2021), Personal history of other specified conditions (09/13/2021), Personal history of other specified conditions (09/13/2021), Personal history of other specified conditions (11/05/2021), Personal history of urinary (tract) infections (06/09/2021), Transient cerebral ischemic attack, unspecified (02/11/2021), Trigger thumb, unspecified thumb (11/09/2022), Unspecified disorder of vestibular function, unspecified ear (10/26/2021), Unspecified visual disturbance (10/06/2021), Unsteadiness on feet (11/16/2021), and Vestibulopathy (05/01/2023).    Past Surgical History:  He has a past surgical history that includes Other surgical history (07/19/2021); Other surgical history (07/19/2021); Other surgical history (07/19/2021); Other surgical history (07/19/2021); Other surgical history (07/19/2021); MR angio head wo IV contrast (11/3/2020); and MR angio neck wo IV contrast (8/27/2021).      Social History:  He reports that he  has never smoked. He has never used smokeless tobacco. He reports that he does not drink alcohol and does not use drugs.    Family History:  Family History   Problem Relation Name Age of Onset    Parkinsonism Other      Aortic stenosis Other      Lymphoma Other          Allergies:  Bee venom protein (honey bee) and Penicillins    Outpatient Medications:  Current Outpatient Medications   Medication Instructions    atorvastatin (LIPITOR) 20 mg, oral, Daily    cholecalciferol (VITAMIN D-3) 25 mcg, oral, Daily    doxepin (SINEQUAN) 10 mg, oral, Nightly    FLUoxetine (PROZAC) 40 mg, oral, Daily    pramipexole (MIRAPEX) 0.5 mg, oral, Nightly    rivaroxaban (XARELTO) 20 mg, oral, Daily with evening meal, Take with food.    traZODone (DESYREL) 150 mg, oral, Nightly    Trulicity 1.5 mg, subcutaneous, Once Weekly        Last Recorded Vitals:  Vitals:    05/06/24 1026   BP: 145/77   Pulse: 65   SpO2: 97%   Weight: 115 kg (254 lb 6.6 oz)       Physical Exam:  Physical Exam  Vitals reviewed.   HENT:      Head: Normocephalic.      Nose: Nose normal.   Eyes:      Pupils: Pupils are equal, round, and reactive to light.   Cardiovascular:      Rate and Rhythm: Normal rate and regular rhythm.   Pulmonary:      Effort: Pulmonary effort is normal.      Breath sounds: Normal breath sounds.   Abdominal:      General: Abdomen is flat.      Palpations: Abdomen is soft.   Musculoskeletal:         General: Normal range of motion.      Cervical back: Normal range of motion.   Skin:     General: Skin is warm and dry.   Neurological:      General: No focal deficit present.      Mental Status: He is alert and oriented to person, place, and time.   Psychiatric:         Mood and Affect: Mood normal.       Neck supple no JVP +bruit        Last Labs:  CBC -  Lab Results   Component Value Date    WBC 6.2 01/17/2024    HGB 15.4 01/17/2024    HCT 46.1 01/17/2024    MCV 89 01/17/2024     01/17/2024       CMP -  Lab Results   Component Value Date     CALCIUM 8.8 01/17/2024    PHOS 3.7 10/26/2021    PROT 6.3 (L) 01/17/2024    ALBUMIN 4.0 01/17/2024    AST 20 01/17/2024    ALT 20 01/17/2024    ALKPHOS 71 01/17/2024    BILITOT 0.7 01/17/2024       LIPID PANEL -   Lab Results   Component Value Date    CHOL 179 01/17/2024    TRIG 177 (H) 01/17/2024    HDL 44.3 01/17/2024    CHHDL 4.0 01/17/2024    LDLF 84 04/27/2023    VLDL 35 01/17/2024    NHDL 135 01/17/2024       RENAL FUNCTION PANEL -   Lab Results   Component Value Date    GLUCOSE 90 01/17/2024     01/17/2024    K 4.4 01/17/2024     01/17/2024    CO2 27 01/17/2024    ANIONGAP 11 01/17/2024    BUN 22 01/17/2024    CREATININE 1.10 01/17/2024    GFRMALE 73 04/27/2023    CALCIUM 8.8 01/17/2024    PHOS 3.7 10/26/2021    ALBUMIN 4.0 01/17/2024        Lab Results   Component Value Date    BNP 16 02/04/2018    HGBA1C 5.1 01/17/2024       Last Cardiology Tests:  ECG:  EKG independently reviewed from today showed sinus rhythm heart rate PVCs heart rate 64 bpm     Echo:  Echocardiogram 11/3/2020   1. The left ventricular systolic function is normal with a 55-60% estimated  ejection fraction.  2. Spectral Doppler shows an abnormal pattern of left ventricular diastolic  filling.  3. There is mild aortic valve regurgitation.  4. There is mild mitral regurgitation.     Ejection Fractions:  LVEF 55-60%  Cath:    Stress Test:      Cardiac Imaging:          Assessment/Plan   Mr. Chilel is a very pleasant 73 year old gentleman with a history of HTN, PAF (Eliquis) and ANNA noncompliant with CPAP, he continues to do well from a cardiac standpoint. EKG today shows sinus rhythm with PVCs. Bruit heard on the right, he will have a carotid ultrasound. Heart rate and blood pressure is well controlled today.      Plan   -call with any questions   -continue Eliquis and Atorvastatin   -follow up in one year   -carotid ultrasound   -will call to review the results         Valerie Hodges, APRN-CNP

## 2024-05-07 ENCOUNTER — TELEPHONE (OUTPATIENT)
Dept: CARDIOLOGY | Facility: HOSPITAL | Age: 74
End: 2024-05-07
Payer: MEDICARE

## 2024-05-07 NOTE — TELEPHONE ENCOUNTER
----- Message from PARRISH Stevens sent at 5/7/2024  8:11 AM EDT -----  Please call and let him know his carotid ultrasound is normal   Thanks   ----- Message -----  From: Lacy Syngo - Cardiology Results In  Sent: 5/6/2024   7:26 PM EDT  To: PARRISH Stevens

## 2024-05-17 ENCOUNTER — TELEPHONE (OUTPATIENT)
Dept: PRIMARY CARE | Facility: CLINIC | Age: 74
End: 2024-05-17
Payer: MEDICARE

## 2024-05-17 NOTE — TELEPHONE ENCOUNTER
Wesley has a lump on his testicle and has an appointment with a urologist in June.  Does he need to see Dr. Penaloza?  Please advise.

## 2024-06-10 LAB
ATRIAL RATE: 64 BPM
P AXIS: -25 DEGREES
P OFFSET: 185 MS
P ONSET: 130 MS
PR INTERVAL: 164 MS
Q ONSET: 212 MS
QRS COUNT: 11 BEATS
QRS DURATION: 104 MS
QT INTERVAL: 402 MS
QTC CALCULATION(BAZETT): 414 MS
QTC FREDERICIA: 410 MS
R AXIS: -39 DEGREES
T AXIS: -2 DEGREES
T OFFSET: 413 MS
VENTRICULAR RATE: 64 BPM

## 2024-06-13 ENCOUNTER — APPOINTMENT (OUTPATIENT)
Dept: UROLOGY | Facility: CLINIC | Age: 74
End: 2024-06-13
Payer: MEDICARE

## 2024-07-22 ENCOUNTER — APPOINTMENT (OUTPATIENT)
Dept: PRIMARY CARE | Facility: CLINIC | Age: 74
End: 2024-07-22
Payer: MEDICARE

## 2024-08-02 ENCOUNTER — APPOINTMENT (OUTPATIENT)
Dept: UROLOGY | Facility: HOSPITAL | Age: 74
End: 2024-08-02
Payer: MEDICARE

## 2024-08-05 ENCOUNTER — APPOINTMENT (OUTPATIENT)
Dept: PRIMARY CARE | Facility: CLINIC | Age: 74
End: 2024-08-05
Payer: MEDICARE

## 2024-08-06 ENCOUNTER — OFFICE VISIT (OUTPATIENT)
Dept: UROLOGY | Facility: HOSPITAL | Age: 74
End: 2024-08-06
Payer: MEDICARE

## 2024-08-06 ENCOUNTER — LAB (OUTPATIENT)
Dept: LAB | Facility: LAB | Age: 74
End: 2024-08-06
Payer: MEDICARE

## 2024-08-06 DIAGNOSIS — R73.02 IGT (IMPAIRED GLUCOSE TOLERANCE): ICD-10-CM

## 2024-08-06 DIAGNOSIS — R93.49 ABNORMAL RADIOLOGIC FINDINGS ON DIAGNOSTIC IMAGING OF OTHER URINARY ORGANS: ICD-10-CM

## 2024-08-06 DIAGNOSIS — N50.89 TESTICULAR MASS: ICD-10-CM

## 2024-08-06 DIAGNOSIS — E55.9 VITAMIN D DEFICIENCY: ICD-10-CM

## 2024-08-06 DIAGNOSIS — N40.1 BENIGN PROSTATIC HYPERPLASIA (BPH) WITH URINARY URGENCY: Primary | ICD-10-CM

## 2024-08-06 DIAGNOSIS — E78.5 DYSLIPIDEMIA, GOAL LDL BELOW 70: ICD-10-CM

## 2024-08-06 DIAGNOSIS — R39.15 BENIGN PROSTATIC HYPERPLASIA (BPH) WITH URINARY URGENCY: Primary | ICD-10-CM

## 2024-08-06 LAB
25(OH)D3 SERPL-MCNC: 42 NG/ML (ref 30–100)
AFP SERPL-MCNC: <4 NG/ML (ref 0–9)
ALBUMIN SERPL BCP-MCNC: 4.1 G/DL (ref 3.4–5)
ALP SERPL-CCNC: 75 U/L (ref 33–136)
ALT SERPL W P-5'-P-CCNC: 15 U/L (ref 10–52)
ANION GAP SERPL CALC-SCNC: 10 MMOL/L (ref 10–20)
AST SERPL W P-5'-P-CCNC: 16 U/L (ref 9–39)
B-HCG SERPL-ACNC: <2 MIU/ML
BILIRUB SERPL-MCNC: 0.6 MG/DL (ref 0–1.2)
BUN SERPL-MCNC: 15 MG/DL (ref 6–23)
CALCIUM SERPL-MCNC: 8.7 MG/DL (ref 8.6–10.3)
CHLORIDE SERPL-SCNC: 105 MMOL/L (ref 98–107)
CHOLEST SERPL-MCNC: 176 MG/DL (ref 0–199)
CHOLESTEROL/HDL RATIO: 4.2
CO2 SERPL-SCNC: 29 MMOL/L (ref 21–32)
CREAT SERPL-MCNC: 1.16 MG/DL (ref 0.5–1.3)
EGFRCR SERPLBLD CKD-EPI 2021: 66 ML/MIN/1.73M*2
GLUCOSE SERPL-MCNC: 83 MG/DL (ref 74–99)
HDLC SERPL-MCNC: 41.9 MG/DL
LDLC SERPL CALC-MCNC: 111 MG/DL
NON HDL CHOLESTEROL: 134 MG/DL (ref 0–149)
POC APPEARANCE, URINE: CLEAR
POC BILIRUBIN, URINE: NEGATIVE
POC BLOOD, URINE: NEGATIVE
POC COLOR, URINE: YELLOW
POC GLUCOSE, URINE: NEGATIVE MG/DL
POC KETONES, URINE: NEGATIVE MG/DL
POC LEUKOCYTES, URINE: NEGATIVE
POC NITRITE,URINE: NEGATIVE
POC PH, URINE: 6.5 PH
POC PROTEIN, URINE: NORMAL MG/DL
POC SPECIFIC GRAVITY, URINE: >=1.03
POC UROBILINOGEN, URINE: 0.2 EU/DL
POTASSIUM SERPL-SCNC: 4.5 MMOL/L (ref 3.5–5.3)
PROT SERPL-MCNC: 6.6 G/DL (ref 6.4–8.2)
SODIUM SERPL-SCNC: 139 MMOL/L (ref 136–145)
TRIGL SERPL-MCNC: 115 MG/DL (ref 0–149)
VLDL: 23 MG/DL (ref 0–40)

## 2024-08-06 PROCEDURE — 80061 LIPID PANEL: CPT

## 2024-08-06 PROCEDURE — 99214 OFFICE O/P EST MOD 30 MIN: CPT | Performed by: UROLOGY

## 2024-08-06 PROCEDURE — 83036 HEMOGLOBIN GLYCOSYLATED A1C: CPT

## 2024-08-06 PROCEDURE — 81003 URINALYSIS AUTO W/O SCOPE: CPT | Mod: QW | Performed by: UROLOGY

## 2024-08-06 PROCEDURE — 82105 ALPHA-FETOPROTEIN SERUM: CPT

## 2024-08-06 PROCEDURE — 84702 CHORIONIC GONADOTROPIN TEST: CPT

## 2024-08-06 PROCEDURE — 80053 COMPREHEN METABOLIC PANEL: CPT

## 2024-08-06 PROCEDURE — 36415 COLL VENOUS BLD VENIPUNCTURE: CPT

## 2024-08-06 PROCEDURE — 82306 VITAMIN D 25 HYDROXY: CPT

## 2024-08-06 NOTE — PROGRESS NOTES
NAME:Wesley Chilel  DATE: 8/6/2024               Subjective:   Chief complaint: testicular lump ( Left side)     HPI:  73 y.o. male presenting for evaluation of testicular lump at the request of Dr. Penaloza    First noticed a few month ago.  Non tender.  No trauma to area      HPI:   Erectile Dysfunction:  Able to obtain - Yes  Able to maintain - Yes  Pain - No  Curvature - No N/A  Libido - Bad    Relationship status -   Sexual Partners - Female     Voiding Symptoms  Frequency: Q 2 hours   Urgency: No  Urge Incontinence: No  Nocturia: 2 times per night Sleep Disorder: No    Stream: mild  Hesitancy: No  Straining: No  Intermittency: No  Sensation of Incomplete Emptying: Yes    Stress Incontinence: No  Pads:  No 0per day     Dysuria:  No  Gross Hematuria:  No  UTI:  No  Stones:  Yes- passed on own     Consumes 36 oz of fluid per day.     Nomedications for his bladder in the past.      Nourodynamic testing in the past.     Bowel Function:   Pt has no constipation. BM's Q 1 days.     Past Medical History:   Diagnosis Date    Abnormal findings on diagnostic imaging of liver and biliary tract 07/14/2021    Abnormal liver scan    Abnormal reflex 10/21/2021    Hyperreflexia    Benign paroxysmal vertigo, right ear 06/23/2022    Benign paroxysmal positional vertigo of right ear    Body mass index (BMI) 35.0-35.9, adult 11/30/2021    BMI 35.0-35.9,adult    Body mass index (BMI) 36.0-36.9, adult 06/23/2022    BMI 36.0-36.9,adult    Body mass index (BMI) 37.0-37.9, adult 07/19/2021    BMI 37.0-37.9, adult    Encounter for other preprocedural examination 10/26/2021    Preoperative clearance    Encounter for preprocedural cardiovascular examination 05/10/2021    Preop cardiovascular exam    Idiopathic chronic gout, unspecified site, without tophus (tophi) 02/11/2021    Chronic gouty arthropathy    Impacted cerumen, right ear 03/24/2021    Impacted cerumen of right ear    Labyrinthine dysfunction, unspecified ear  08/16/2021    Balance problem due to vestibular dysfunction, unspecified laterality    Morbid (severe) obesity due to excess calories (Multi) 12/14/2022    Severe obesity with body mass index (BMI) of 35.0 to 35.9 and comorbidity    Morbid (severe) obesity due to excess calories (Multi) 08/23/2021    Class 2 severe obesity due to excess calories with serious comorbidity and body mass index (BMI) of 37.0 to 37.9 in adult    Obstructive sleep apnea (adult) (pediatric) 06/23/2022    Obstructive sleep apnea, adult    Other conditions influencing health status 06/23/2022    BMI over 35    Other disorders of electrolyte and fluid balance, not elsewhere classified 11/16/2021    Disequilibrium syndrome    Other specified crystal arthropathies, unspecified site 09/15/2020    Other specified crystal arthropathies, unspecified site    Other specified disorders of eustachian tube, bilateral 07/19/2021    Eustachian tube dysfunction, bilateral    Other specified disorders of thyroid 10/01/2021    Thyroid fullness    Pain in left hand 08/17/2020    Hand pain, left    Pain in unspecified elbow 11/09/2022    Elbow pain    Personal history of other diseases of the circulatory system 11/12/2020    History of essential hypertension    Personal history of other diseases of the circulatory system 11/10/2020    History of peripheral arterial disease    Personal history of other diseases of the circulatory system 08/19/2021    History of atrial fibrillation    Personal history of other diseases of the musculoskeletal system and connective tissue 11/09/2022    History of crystal arthropathy    Personal history of other diseases of the musculoskeletal system and connective tissue 11/09/2022    History of gout    Personal history of other diseases of the nervous system and sense organs     History of tremor    Personal history of other diseases of urinary system 09/13/2021    History of hematuria    Personal history of other specified  conditions 02/05/2018    History of snoring    Personal history of other specified conditions 07/19/2021    History of vertigo    Personal history of other specified conditions 12/14/2022    History of impaired glucose tolerance    Personal history of other specified conditions 11/30/2020    History of palpitations    Personal history of other specified conditions 11/16/2021    History of ataxia    Personal history of other specified conditions 09/13/2021    History of nocturia    Personal history of other specified conditions 09/13/2021    History of right flank pain    Personal history of other specified conditions 11/05/2021    History of dizziness    Personal history of urinary (tract) infections 06/09/2021    History of urinary tract infection    Transient cerebral ischemic attack, unspecified 02/11/2021    Transient ischemic attack, acute    Trigger thumb, unspecified thumb 11/09/2022    Trigger thumb, acquired    Unspecified disorder of vestibular function, unspecified ear 10/26/2021    Vestibulopathy    Unspecified visual disturbance 10/06/2021    Vision disturbance    Unsteadiness on feet 11/16/2021    Unsteadiness on feet    Vestibulopathy 05/01/2023     Past Surgical History:   Procedure Laterality Date    MR HEAD ANGIO WO IV CONTRAST  11/3/2020    MR HEAD ANGIO WO IV CONTRAST 11/3/2020 [facility]    MR NECK ANGIO WO IV CONTRAST  8/27/2021    MR NECK ANGIO WO IV CONTRAST 8/27/2021 [facility]    OTHER SURGICAL HISTORY  07/19/2021    Rotator cuff repair    OTHER SURGICAL HISTORY  07/19/2021    Bladder surgery    OTHER SURGICAL HISTORY  07/19/2021    Tonsillectomy with adenoidectomy    OTHER SURGICAL HISTORY  07/19/2021    Nasal septoplasty    OTHER SURGICAL HISTORY  07/19/2021    Cataract surgery     Social History     Tobacco Use    Smoking status: Never    Smokeless tobacco: Never   Substance Use Topics    Alcohol use: Never     Family History   Problem Relation Name Age of Onset     Parkinsonism Other      Aortic stenosis Other      Lymphoma Other       [unfilled]  Current Outpatient Medications on File Prior to Visit   Medication Sig Dispense Refill    atorvastatin (Lipitor) 20 mg tablet Take 1 tablet (20 mg) by mouth once daily. 90 tablet 3    cholecalciferol (Vitamin D-3) 25 MCG (1000 UT) tablet Take 1 tablet (25 mcg) by mouth once daily.      doxepin (SINEquan) 10 mg capsule Take 1 capsule (10 mg) by mouth once daily at bedtime. 90 capsule 3    dulaglutide (Trulicity) 1.5 mg/0.5 mL pen injector injection Inject 1.5 mg under the skin 1 (one) time per week. (Patient not taking: Reported on 5/6/2024) 2 mL 0    FLUoxetine (PROzac) 40 mg capsule Take 1 capsule (40 mg) by mouth once daily. 90 capsule 3    pramipexole (Mirapex) 0.5 mg tablet Take 1 tablet (0.5 mg) by mouth once daily at bedtime. 90 tablet 3    rivaroxaban (Xarelto) 20 mg tablet Take 1 tablet (20 mg) by mouth once daily in the evening. Take with meals. Take with food. 90 tablet 3    traZODone (Desyrel) 150 mg tablet Take 1 tablet (150 mg) by mouth once daily at bedtime. 90 tablet 3     No current facility-administered medications on file prior to visit.     Wesley is allergic to bee venom protein (honey bee) and penicillins.     Review of Systems    14 point ROS reviewed and discussed with the patient. Pertinent positives/negatives discussed in the History of Present Illness (HPI).      FH:  Prostate CA: No  Bladder CA: No  Kidney CA: No  Stones: No    Social History  Occupation: retired   Tob: No  Etoh: No      Objective:     There is no height or weight on file to calculate BMI.   There were no vitals taken for this visit.     Physical Exam   1. Constitutional: NAD, Well-developed, Well-nourished  2. Respiratory: Unlabored breathing, no audible wheezes, no use of accessory muscles   3. Cardiovascular: No JVD, extremities perfused, no edema  4. Abdomen: Soft, non-tender, non-distended, no masses or hernia.  No CVA tenderness.     5. Skin: no visible lesions, plaque, rashes, jaundice  6. Neuro: Gait normal, no focal neurologic deficit  7. Psychiatric: Mood and affect normal and appropriate, alert and oriented  8. :    Phallus: Normal, no lesions.     Meatus: Orthotopic and patent.   Testes:  Descended bilaterally, symmetric, without tenderness or mass.   Epididymis is normal bilaterally.  Firm nodule at epididymal head. No hydrocele.  No varicocele.   Scrotum: No lesions.   Inguinal canal: No hernia or tenderness.        Labs  Lab Results   Component Value Date    PSA 0.73 02/10/2021     Lab Results   Component Value Date    GFRMALE 73 04/27/2023     Lab Results   Component Value Date    CREATININE 1.10 01/17/2024     Lab Results   Component Value Date    CHOL 179 01/17/2024     Lab Results   Component Value Date    HDL 44.3 01/17/2024     Lab Results   Component Value Date    CHHDL 4.0 01/17/2024     Lab Results   Component Value Date    LDLF 84 04/27/2023     Lab Results   Component Value Date    VLDL 35 01/17/2024     Lab Results   Component Value Date    TRIG 177 (H) 01/17/2024     Lab Results   Component Value Date    HGBA1C 5.1 01/17/2024     Lab Results   Component Value Date    HCT 46.1 01/17/2024       Imaging    Procedures:     PVR:    Assessment/Plan:   Wesley OQUENDO Ranjana presents with       1. Benign prostatic hyperplasia (BPH) with urinary urgency    2. Testicular mass        Orders Placed This Encounter   Procedures    Measure post void residual    POCT UA Automated manually resulted     Will get scrotal US to better evaluated  Tumor markers    Will fu with results

## 2024-08-07 LAB
EST. AVERAGE GLUCOSE BLD GHB EST-MCNC: 97 MG/DL
HBA1C MFR BLD: 5 %

## 2024-08-08 ENCOUNTER — HOSPITAL ENCOUNTER (OUTPATIENT)
Dept: RADIOLOGY | Facility: CLINIC | Age: 74
Discharge: HOME | End: 2024-08-08
Payer: MEDICARE

## 2024-08-08 DIAGNOSIS — N50.89 TESTICULAR MASS: ICD-10-CM

## 2024-08-08 PROCEDURE — 76870 US EXAM SCROTUM: CPT | Performed by: RADIOLOGY

## 2024-08-08 PROCEDURE — 93975 VASCULAR STUDY: CPT

## 2024-08-09 ENCOUNTER — APPOINTMENT (OUTPATIENT)
Dept: UROLOGY | Facility: HOSPITAL | Age: 74
End: 2024-08-09
Payer: MEDICARE

## 2024-08-12 ENCOUNTER — APPOINTMENT (OUTPATIENT)
Dept: PRIMARY CARE | Facility: CLINIC | Age: 74
End: 2024-08-12
Payer: MEDICARE

## 2024-08-12 VITALS
SYSTOLIC BLOOD PRESSURE: 116 MMHG | HEIGHT: 68 IN | BODY MASS INDEX: 38.65 KG/M2 | DIASTOLIC BLOOD PRESSURE: 76 MMHG | WEIGHT: 255 LBS | HEART RATE: 72 BPM

## 2024-08-12 DIAGNOSIS — G47.33 OSA ON CPAP: ICD-10-CM

## 2024-08-12 DIAGNOSIS — G25.81 RESTLESS LEGS SYNDROME: Primary | ICD-10-CM

## 2024-08-12 DIAGNOSIS — N45.1 EPIDIDYMITIS, LEFT: ICD-10-CM

## 2024-08-12 DIAGNOSIS — I49.5 SINUS NODE DYSFUNCTION (MULTI): ICD-10-CM

## 2024-08-12 DIAGNOSIS — H81.11 BENIGN PAROXYSMAL POSITIONAL VERTIGO OF RIGHT EAR: ICD-10-CM

## 2024-08-12 DIAGNOSIS — E78.5 DYSLIPIDEMIA, GOAL LDL BELOW 70: ICD-10-CM

## 2024-08-12 DIAGNOSIS — Z86.79 HISTORY OF CEREBROVASCULAR DISEASE: ICD-10-CM

## 2024-08-12 DIAGNOSIS — E55.9 VITAMIN D DEFICIENCY: ICD-10-CM

## 2024-08-12 DIAGNOSIS — E66.01 CLASS 2 SEVERE OBESITY DUE TO EXCESS CALORIES WITH SERIOUS COMORBIDITY AND BODY MASS INDEX (BMI) OF 38.0 TO 38.9 IN ADULT (MULTI): ICD-10-CM

## 2024-08-12 DIAGNOSIS — F32.5 MAJOR DEPRESSIVE DISORDER WITH SINGLE EPISODE, IN FULL REMISSION (CMS-HCC): ICD-10-CM

## 2024-08-12 DIAGNOSIS — I48.0 PAROXYSMAL ATRIAL FIBRILLATION (MULTI): ICD-10-CM

## 2024-08-12 PROBLEM — R73.02 IGT (IMPAIRED GLUCOSE TOLERANCE): Status: RESOLVED | Noted: 2023-05-01 | Resolved: 2024-08-12

## 2024-08-12 PROCEDURE — 1036F TOBACCO NON-USER: CPT | Performed by: INTERNAL MEDICINE

## 2024-08-12 PROCEDURE — 1160F RVW MEDS BY RX/DR IN RCRD: CPT | Performed by: INTERNAL MEDICINE

## 2024-08-12 PROCEDURE — 3008F BODY MASS INDEX DOCD: CPT | Performed by: INTERNAL MEDICINE

## 2024-08-12 PROCEDURE — 99214 OFFICE O/P EST MOD 30 MIN: CPT | Performed by: INTERNAL MEDICINE

## 2024-08-12 PROCEDURE — 1159F MED LIST DOCD IN RCRD: CPT | Performed by: INTERNAL MEDICINE

## 2024-08-12 RX ORDER — SEMAGLUTIDE 0.25 MG/.5ML
0.25 INJECTION, SOLUTION SUBCUTANEOUS
Qty: 2 ML | Refills: 6 | Status: SHIPPED | OUTPATIENT
Start: 2024-08-18 | End: 2024-08-13 | Stop reason: SDUPTHER

## 2024-08-12 RX ORDER — PRAMIPEXOLE DIHYDROCHLORIDE 0.75 MG/1
0.75 TABLET ORAL NIGHTLY
Qty: 90 TABLET | Refills: 3 | Status: SHIPPED | OUTPATIENT
Start: 2024-08-12 | End: 2025-08-12

## 2024-08-12 NOTE — PROGRESS NOTES
"Subjective   Reason for Visit: Wesley Chilel is an 73 y.o. male here for a Medicare Wellness visit.     Past Medical, Surgical, and Family History reviewed and updated in chart.    Reviewed all medications by prescribing practitioner or clinical pharmacist (such as prescriptions, OTCs, herbal therapies and supplements) and documented in the medical record.    HPI    Patient Care Team:  Hany Penaloza DO as PCP - General  Hany Penaloza DO as PCP - [facility] ACO Attributed Provider     Review of Systems   All other systems reviewed and are negative.      Objective   Vitals:  /76   Pulse 72   Ht 1.727 m (5' 8\")   Wt 116 kg (255 lb)   BMI 38.77 kg/m²       Physical Exam  Vitals and nursing note reviewed.   Constitutional:       General: He is not in acute distress.     Appearance: Normal appearance. He is well-developed. He is obese. He is not toxic-appearing.   HENT:      Head: Normocephalic and atraumatic.      Right Ear: Tympanic membrane and external ear normal.      Left Ear: Tympanic membrane and external ear normal.      Nose: Nose normal.      Mouth/Throat:      Mouth: Mucous membranes are moist.      Pharynx: Oropharynx is clear. No oropharyngeal exudate or posterior oropharyngeal erythema.      Tonsils: No tonsillar exudate. 2+ on the right. 2+ on the left.   Eyes:      Extraocular Movements: Extraocular movements intact.      Conjunctiva/sclera: Conjunctivae normal.   Cardiovascular:      Rate and Rhythm: Normal rate and regular rhythm.      Pulses: Normal pulses.      Heart sounds: Normal heart sounds. No murmur heard.  Pulmonary:      Effort: Pulmonary effort is normal.      Breath sounds: Normal breath sounds.   Abdominal:      General: Abdomen is flat. Bowel sounds are normal.      Palpations: Abdomen is soft.   Musculoskeletal:      Cervical back: Neck supple.   Feet:      Right foot:      Skin integrity: Skin integrity normal. No ulcer, blister, skin breakdown, erythema, warmth or " callus.      Toenail Condition: Right toenails are normal.      Left foot:      Skin integrity: Skin integrity normal. No ulcer, blister, skin breakdown, erythema, warmth or callus.      Toenail Condition: Left toenails are normal.   Lymphadenopathy:      Cervical: No cervical adenopathy.   Skin:     General: Skin is warm and dry.      Capillary Refill: Capillary refill takes more than 3 seconds.      Findings: No rash.   Neurological:      Mental Status: He is alert. Mental status is at baseline.      Sensory: Sensation is intact.   Psychiatric:         Mood and Affect: Mood normal.         Behavior: Behavior normal.         Thought Content: Thought content normal.         Judgment: Judgment normal.         Assessment/Plan   Problem List Items Addressed This Visit             ICD-10-CM    Benign paroxysmal positional vertigo of right ear H81.11     Good response with vestibular rehabilitation         Dyslipidemia, goal LDL below 70 E78.5     LDL cholesterol of 111 with HDL of 42 triglyceride level 115 could benefit from further titration of atorvastatin continue 20 mg daily consider LDL goal less than 100 with next visit in the setting of established risk for cerebrovascular disease         Relevant Medications    semaglutide, weight loss, (Wegovy) 0.25 mg/0.5 mL pen injector (Start on 8/18/2024)    Major depressive disorder with single episode, in full remission (CMS-HCC) F32.5    ANNA on CPAP G47.33     Compliant with nighttime use of CPAP         Relevant Medications    semaglutide, weight loss, (Wegovy) 0.25 mg/0.5 mL pen injector (Start on 8/18/2024)    Paroxysmal atrial fibrillation (Multi) I48.0     Normal sinus rhythm continue chronic anticoagulation with rivaroxaban 20 mg daily blood pressure and heart rate stable at this time         Relevant Medications    semaglutide, weight loss, (Wegovy) 0.25 mg/0.5 mL pen injector (Start on 8/18/2024)    pramipexole (Mirapex) 0.75 mg tablet    Other Relevant Orders     Follow Up In [facility] - PCP - Established    Follow Up [facility] - Pharmacy    Restless legs syndrome - Primary G25.81     Aggravated by periodic limb movement disorder and obstructive sleep apnea treated with CPAP increase pramipexole from 0.5 to 0.75 mg nightly         Relevant Medications    pramipexole (Mirapex) 0.75 mg tablet    Other Relevant Orders    Follow [facility] - PCP - Established    Follow Up [facility] - Pharmacy    Sinus node dysfunction (Multi) I49.5    Relevant Medications    pramipexole (Mirapex) 0.75 mg tablet    Other Relevant Orders    Follow Up [facility] - PCP - Established    Follow [facility] - Pharmacy    Epididymitis, left N45.1     Left testicular mass evaluate with ultrasound and tumor markers benign unremarkable consistent with epididymitis left testicle resolved at this time patient with mild hydrocele evaluated and treated with urologist stable at this time         Vitamin D deficiency E55.9     Vitamin D level optimal at 42 continue         Class 2 severe obesity due to excess calories with serious comorbidity and body mass index (BMI) of 38.0 to 38.9 in adult (Multi) E66.01, Z68.38     BMI worsened from 36-38.  Patient previously placed on GLP-1 agonist therapy with Trulicity with good response not covered by insurance due to coverage only for diabetes mellitus.  Patient does have insulin resistance risk factors and treatment of cardiovascular disease and cerebrovascular disease severe osteoarthritis of the knees and obstructive sleep apnea for which require CPAP augmenting indications for use of semaglutide for weight loss will place prior authorization and consult pharmacy to assist in obtaining indications for treatment         Relevant Medications    semaglutide, weight loss, (Wegovy) 0.25 mg/0.5 mL pen injector (Start on 8/18/2024)    pramipexole (Mirapex) 0.75 mg tablet    Other  Relevant Orders    Follow Up [facility] - PCP - Established    Follow Up [facility] - Pharmacy    History of cerebrovascular disease Z86.79     Continue aggressive risk factor management with optimal control blood pressureAnd cholesterol no evidence of impaired fasting sugars at this time work on treatment         Relevant Medications    semaglutide, weight loss, (Wegovy) 0.25 mg/0.5 mL pen injector (Start on 8/18/2024)    pramipexole (Mirapex) 0.75 mg tablet    Other Relevant Orders    Follow Up [facility] - PCP - Established    Follow Up In [facility] - Pharmacy

## 2024-08-12 NOTE — ASSESSMENT & PLAN NOTE
BMI worsened from 36-38.  Patient previously placed on GLP-1 agonist therapy with Trulicity with good response not covered by insurance due to coverage only for diabetes mellitus.  Patient does have insulin resistance risk factors and treatment of cardiovascular disease and cerebrovascular disease severe osteoarthritis of the knees and obstructive sleep apnea for which require CPAP augmenting indications for use of semaglutide for weight loss will place prior authorization and consult pharmacy to assist in obtaining indications for treatment

## 2024-08-12 NOTE — ASSESSMENT & PLAN NOTE
LDL cholesterol of 111 with HDL of 42 triglyceride level 115 could benefit from further titration of atorvastatin continue 20 mg daily consider LDL goal less than 100 with next visit in the setting of established risk for cerebrovascular disease

## 2024-08-12 NOTE — ASSESSMENT & PLAN NOTE
Normal sinus rhythm continue chronic anticoagulation with rivaroxaban 20 mg daily blood pressure and heart rate stable at this time

## 2024-08-12 NOTE — ASSESSMENT & PLAN NOTE
Continue aggressive risk factor management with optimal control blood pressureAnd cholesterol no evidence of impaired fasting sugars at this time work on treatment

## 2024-08-12 NOTE — ASSESSMENT & PLAN NOTE
Aggravated by periodic limb movement disorder and obstructive sleep apnea treated with CPAP increase pramipexole from 0.5 to 0.75 mg nightly

## 2024-08-12 NOTE — ASSESSMENT & PLAN NOTE
Left testicular mass evaluate with ultrasound and tumor markers benign unremarkable consistent with epididymitis left testicle resolved at this time patient with mild hydrocele evaluated and treated with urologist stable at this time

## 2024-08-12 NOTE — PROGRESS NOTES
"Subjective   Patient ID: Wesley Chilel is a 73 y.o. male who presents for Follow-up (Discuss wegovy ).    HPI     Review of Systems    Objective   /76   Pulse 72   Ht 1.727 m (5' 8\")   Wt 116 kg (255 lb)   BMI 38.77 kg/m²     Physical Exam    Assessment/Plan          "

## 2024-08-13 ENCOUNTER — TELEPHONE (OUTPATIENT)
Dept: PRIMARY CARE | Facility: CLINIC | Age: 74
End: 2024-08-13
Payer: MEDICARE

## 2024-08-13 DIAGNOSIS — E78.5 DYSLIPIDEMIA, GOAL LDL BELOW 70: ICD-10-CM

## 2024-08-13 DIAGNOSIS — Z86.79 HISTORY OF CEREBROVASCULAR DISEASE: ICD-10-CM

## 2024-08-13 DIAGNOSIS — I48.0 PAROXYSMAL ATRIAL FIBRILLATION (MULTI): ICD-10-CM

## 2024-08-13 DIAGNOSIS — E66.01 CLASS 2 SEVERE OBESITY DUE TO EXCESS CALORIES WITH SERIOUS COMORBIDITY AND BODY MASS INDEX (BMI) OF 38.0 TO 38.9 IN ADULT (MULTI): ICD-10-CM

## 2024-08-13 DIAGNOSIS — G47.33 OSA ON CPAP: ICD-10-CM

## 2024-08-13 RX ORDER — SEMAGLUTIDE 0.25 MG/.5ML
0.25 INJECTION, SOLUTION SUBCUTANEOUS
Qty: 2 ML | Refills: 6 | Status: SHIPPED | OUTPATIENT
Start: 2024-08-18 | End: 2025-02-24

## 2024-08-13 NOTE — TELEPHONE ENCOUNTER
His wife called to see if we could send over the Semagludide 0.25 mg takes 1 weekly [facility] in [facility] number is [phone]

## 2024-08-13 NOTE — TELEPHONE ENCOUNTER
His wife called to see if we could send over the Semagludide 0.25 mg takes 1 weekly [facility] in [facility] number is [phone]

## 2024-08-15 ENCOUNTER — TELEPHONE (OUTPATIENT)
Dept: PRIMARY CARE | Facility: CLINIC | Age: 74
End: 2024-08-15
Payer: MEDICARE

## 2024-08-15 DIAGNOSIS — E66.01 CLASS 2 SEVERE OBESITY DUE TO EXCESS CALORIES WITH SERIOUS COMORBIDITY AND BODY MASS INDEX (BMI) OF 38.0 TO 38.9 IN ADULT (MULTI): Primary | ICD-10-CM

## 2024-08-15 DIAGNOSIS — R73.02 IGT (IMPAIRED GLUCOSE TOLERANCE): ICD-10-CM

## 2024-08-15 DIAGNOSIS — E78.5 DYSLIPIDEMIA, GOAL LDL BELOW 70: ICD-10-CM

## 2024-08-22 ENCOUNTER — TELEPHONE (OUTPATIENT)
Dept: PRIMARY CARE | Facility: CLINIC | Age: 74
End: 2024-08-22
Payer: MEDICARE

## 2024-09-03 ENCOUNTER — APPOINTMENT (OUTPATIENT)
Dept: PHARMACY | Facility: HOSPITAL | Age: 74
End: 2024-09-03
Payer: MEDICARE

## 2024-09-03 DIAGNOSIS — G25.81 RESTLESS LEGS SYNDROME: ICD-10-CM

## 2024-09-03 DIAGNOSIS — I49.5 SINUS NODE DYSFUNCTION (MULTI): ICD-10-CM

## 2024-09-03 DIAGNOSIS — Z86.79 HISTORY OF CEREBROVASCULAR DISEASE: ICD-10-CM

## 2024-09-03 DIAGNOSIS — E66.01 CLASS 2 SEVERE OBESITY DUE TO EXCESS CALORIES WITH SERIOUS COMORBIDITY AND BODY MASS INDEX (BMI) OF 38.0 TO 38.9 IN ADULT (MULTI): ICD-10-CM

## 2024-09-03 DIAGNOSIS — I48.0 PAROXYSMAL ATRIAL FIBRILLATION (MULTI): ICD-10-CM

## 2024-09-03 RX ORDER — SEMAGLUTIDE 0.5 MG/.5ML
0.5 INJECTION, SOLUTION SUBCUTANEOUS WEEKLY
Qty: 2 ML | Refills: 0 | Status: SHIPPED | OUTPATIENT
Start: 2024-09-03 | End: 2024-10-03

## 2024-09-03 NOTE — TELEPHONE ENCOUNTER
Patient was notified  Message from plan: Approved. This drug has been approved under the Member's Medicare Part D benefit. Approved quantity: 2 units per 28 day(s). You may fill up to a 90 day supply except for those on Specialty Tier 5, which can be filled up to a 30 day supply. Please call the pharmacy to process the prescription claim.. Authorization Expiration Date: December 31, 2099.  Will fax to pharmacy.

## 2024-09-04 PROCEDURE — RXMED WILLOW AMBULATORY MEDICATION CHARGE

## 2024-09-04 NOTE — PROGRESS NOTES
Pharmacy Clinic Note    Wesley Chilel is a 73 y.o. male was referred to Clinical Pharmacy Team for weight management.    Referring Provider: Hany Penaloza DO    Subjective   Allergies   Allergen Reactions    Bee Venom Protein (Honey Bee) Swelling    Penicillins Swelling       [id number] - [place], [place] - [address] [facility] [address]  [address] [facility] [[email or web address] [address]  [facility] [address]  Phone: [phone] Fax: [phone]    [id number] - [place], [place] - [address]  [address] [address]  Phone: [phone] Fax: [phone]    [facility]  [address], [address]  [facility] [address]  Phone: [phone] Fax: [phone]      Objective     There were no vitals taken for this visit.     LAB  Lab Results   Component Value Date    BILITOT 0.6 08/06/2024    CALCIUM 8.7 08/06/2024    CO2 29 08/06/2024     08/06/2024    CREATININE 1.16 08/06/2024    GLUCOSE 83 08/06/2024    ALKPHOS 75 08/06/2024    K 4.5 08/06/2024    PROT 6.6 08/06/2024     08/06/2024    AST 16 08/06/2024    ALT 15 08/06/2024    BUN 15 08/06/2024    ANIONGAP 10 08/06/2024    MG 1.67 11/03/2020    PHOS 3.7 10/26/2021    ALBUMIN 4.1 08/06/2024    GFRMALE 73 04/27/2023     Lab Results   Component Value Date    TRIG 115 08/06/2024    CHOL 176 08/06/2024    LDLCALC 111 (H) 08/06/2024    HDL 41.9 08/06/2024     Lab Results   Component Value Date    HGBA1C 5.0 08/06/2024       Current Outpatient Medications on File Prior to Visit   Medication Sig Dispense Refill    atorvastatin (Lipitor) 20 mg tablet Take 1 tablet (20 mg) by mouth once daily. 90 tablet 3    cholecalciferol (Vitamin D-3) 25 MCG (1000 UT) tablet Take 1 tablet (25 mcg) by mouth once daily.      doxepin (SINEquan) 10 mg capsule Take 1 capsule (10 mg) by mouth once daily at bedtime. 90 capsule 3    FLUoxetine (PROzac) 40 mg capsule Take 1 capsule (40 mg) by mouth once daily. 90  capsule 3    pramipexole (Mirapex) 0.75 mg tablet Take 1 tablet (0.75 mg) by mouth once daily at bedtime. 90 tablet 3    rivaroxaban (Xarelto) 20 mg tablet Take 1 tablet (20 mg) by mouth once daily in the evening. Take with meals. Take with food. 90 tablet 3    traZODone (Desyrel) 150 mg tablet Take 1 tablet (150 mg) by mouth once daily at bedtime. 90 tablet 3    [DISCONTINUED] semaglutide, weight loss, (Wegovy) 0.25 mg/0.5 mL pen injector Inject 0.25 mg under the skin 1 (one) time per week for 28 doses. 2 mL 6     No current facility-administered medications on file prior to visit.      DRUG INTERACTIONS  - No significant drug-drug interactions exist that require change in therapy  _______________________________________________________________________  PATIENT EDUCATION/GOALS    1. Patient re-referred to pharmacy team for GLP1RA management for weight loss. Since last visit, patient received a PA approval for use of Wegovy for weight loss/CV risk reduction, however, the medication remains >$500 even with PA. Patient agrees to start Wegovy at 0.5 mg weekly and follow up for aggressive titration despite cost.  _______________________________________________________________________    Assessment/Plan   Problem List Items Addressed This Visit       Paroxysmal atrial fibrillation (Multi)    Restless legs syndrome    Sinus node dysfunction (Multi)    Class 2 severe obesity due to excess calories with serious comorbidity and body mass index (BMI) of 38.0 to 38.9 in adult (Multi)     Start Wegovy 0.5 mg once weekly.         Relevant Medications    semaglutide, weight loss, (Wegovy) 0.5 mg/0.5 mL pen injector    History of cerebrovascular disease    Relevant Medications    semaglutide, weight loss, (Wegovy) 0.5 mg/0.5 mL pen injector        Continue all meds under the continuation of care with the referring provider and clinical pharmacy team.    Clinical Pharmacist follow-up: monthly    Hai Farr, PharmD     Verbal  consent to manage patient's drug therapy was obtained from [the patient and/or an individual authorized to act on behalf of a patient]. They were informed they may decline to participate or withdraw from participation in pharmacy services at any time.

## 2024-09-05 ENCOUNTER — PHARMACY VISIT (OUTPATIENT)
Dept: PHARMACY | Facility: CLINIC | Age: 74
End: 2024-09-05
Payer: COMMERCIAL

## 2024-09-23 ENCOUNTER — APPOINTMENT (OUTPATIENT)
Dept: PHARMACY | Facility: HOSPITAL | Age: 74
End: 2024-09-23
Payer: MEDICARE

## 2024-09-23 DIAGNOSIS — Z86.79 HISTORY OF CEREBROVASCULAR DISEASE: ICD-10-CM

## 2024-09-23 DIAGNOSIS — I48.0 PAROXYSMAL ATRIAL FIBRILLATION (MULTI): ICD-10-CM

## 2024-09-23 DIAGNOSIS — E66.01 CLASS 2 SEVERE OBESITY DUE TO EXCESS CALORIES WITH SERIOUS COMORBIDITY AND BODY MASS INDEX (BMI) OF 38.0 TO 38.9 IN ADULT: ICD-10-CM

## 2024-09-23 PROCEDURE — RXMED WILLOW AMBULATORY MEDICATION CHARGE

## 2024-09-23 RX ORDER — SEMAGLUTIDE 1 MG/.5ML
1 INJECTION, SOLUTION SUBCUTANEOUS
Qty: 2 ML | Refills: 0 | Status: SHIPPED | OUTPATIENT
Start: 2024-09-29

## 2024-09-23 NOTE — PROGRESS NOTES
Pharmacy Clinic Note    Wesley Chilel is a 73 y.o. male was referred to Clinical Pharmacy Team for weight management/CV risk reduction.    Referring Provider: Hany Penaloza,     Subjective   Allergies   Allergen Reactions    Bee Venom Protein (Honey Bee) Swelling    Penicillins Swelling       [id number] - [place], [place] - [address] [facility] [address]  [address]  [facility] [address]  Phone: [phone] Fax: [phone]    [id number] - [facility] [address] [facility]  [address] [place] [address]  Phone: [phone] Fax: [phone]    [facility]  [address]  [facility] [address]  Phone: [phone] Fax: [phone]      Objective     There were no vitals taken for this visit.     LAB  Lab Results   Component Value Date    BILITOT 0.6 08/06/2024    CALCIUM 8.7 08/06/2024    CO2 29 08/06/2024     08/06/2024    CREATININE 1.16 08/06/2024    GLUCOSE 83 08/06/2024    ALKPHOS 75 08/06/2024    K 4.5 08/06/2024    PROT 6.6 08/06/2024     08/06/2024    AST 16 08/06/2024    ALT 15 08/06/2024    BUN 15 08/06/2024    ANIONGAP 10 08/06/2024    MG 1.67 11/03/2020    PHOS 3.7 10/26/2021    ALBUMIN 4.1 08/06/2024    GFRMALE 73 04/27/2023     Lab Results   Component Value Date    TRIG 115 08/06/2024    CHOL 176 08/06/2024    LDLCALC 111 (H) 08/06/2024    HDL 41.9 08/06/2024     Lab Results   Component Value Date    HGBA1C 5.0 08/06/2024       Current Outpatient Medications on File Prior to Visit   Medication Sig Dispense Refill    atorvastatin (Lipitor) 20 mg tablet Take 1 tablet (20 mg) by mouth once daily. 90 tablet 3    cholecalciferol (Vitamin D-3) 25 MCG (1000 UT) tablet Take 1 tablet (25 mcg) by mouth once daily.      doxepin (SINEquan) 10 mg capsule Take 1 capsule (10 mg) by mouth once daily at bedtime. 90 capsule 3    FLUoxetine (PROzac) 40 mg capsule Take 1 capsule (40 mg) by  mouth once daily. 90 capsule 3    pramipexole (Mirapex) 0.75 mg tablet Take 1 tablet (0.75 mg) by mouth once daily at bedtime. 90 tablet 3    rivaroxaban (Xarelto) 20 mg tablet Take 1 tablet (20 mg) by mouth once daily in the evening. Take with meals. Take with food. 90 tablet 3    traZODone (Desyrel) 150 mg tablet Take 1 tablet (150 mg) by mouth once daily at bedtime. 90 tablet 3    [DISCONTINUED] semaglutide, weight loss, (Wegovy) 0.5 mg/0.5 mL pen injector Inject 0.5 mg under the skin 1 (one) time per week for 4 doses. 2 mL 0     No current facility-administered medications on file prior to visit.      Reported weight loss since Wegovy initiation: ~4 lbs    DRUG INTERACTIONS  - No significant drug-drug interactions exist that require change in therapy  _______________________________________________________________________  PATIENT EDUCATION/GOALS    1. Patient reports that he has started Wegovy 0.5 mg and has taken 2 doses so far. He reports no ill effects, and states the medication feels more impactful than when he was on Trulicity. Given positive results, we will increase to Wegovy 1 mg weekly which pt will begin on 10/7  _______________________________________________________________________    Assessment/Plan   Problem List Items Addressed This Visit       Paroxysmal atrial fibrillation (Multi)    Relevant Medications    semaglutide, weight loss, (Wegovy) 1 mg/0.5 mL pen injector (Start on 9/29/2024)    Class 2 severe obesity due to excess calories with serious comorbidity and body mass index (BMI) of 38.0 to 38.9 in adult (Multi)     Start Wegovy 1 mg once weekly.         Relevant Medications    semaglutide, weight loss, (Wegovy) 1 mg/0.5 mL pen injector (Start on 9/29/2024)    History of cerebrovascular disease    Relevant Medications    semaglutide, weight loss, (Wegovy) 1 mg/0.5 mL pen injector (Start on 9/29/2024)        Continue all meds under the continuation of care with the referring provider and  clinical pharmacy team.    Clinical Pharmacist follow-up: monthly    Hai Farr, Mami     Verbal consent to manage patient's drug therapy was obtained from [the patient and/or an individual authorized to act on behalf of a patient]. They were informed they may decline to participate or withdraw from participation in pharmacy services at any time.

## 2024-09-25 ENCOUNTER — PHARMACY VISIT (OUTPATIENT)
Dept: PHARMACY | Facility: CLINIC | Age: 74
End: 2024-09-25
Payer: COMMERCIAL

## 2024-10-21 ENCOUNTER — APPOINTMENT (OUTPATIENT)
Dept: PHARMACY | Facility: HOSPITAL | Age: 74
End: 2024-10-21
Payer: MEDICARE

## 2024-10-21 DIAGNOSIS — E66.812 CLASS 2 SEVERE OBESITY DUE TO EXCESS CALORIES WITH SERIOUS COMORBIDITY AND BODY MASS INDEX (BMI) OF 38.0 TO 38.9 IN ADULT: ICD-10-CM

## 2024-10-21 DIAGNOSIS — E66.01 CLASS 2 SEVERE OBESITY DUE TO EXCESS CALORIES WITH SERIOUS COMORBIDITY AND BODY MASS INDEX (BMI) OF 38.0 TO 38.9 IN ADULT: ICD-10-CM

## 2024-10-21 DIAGNOSIS — Z86.79 HISTORY OF CEREBROVASCULAR DISEASE: ICD-10-CM

## 2024-10-21 DIAGNOSIS — I48.0 PAROXYSMAL ATRIAL FIBRILLATION (MULTI): ICD-10-CM

## 2024-10-21 PROCEDURE — RXMED WILLOW AMBULATORY MEDICATION CHARGE

## 2024-10-21 RX ORDER — SEMAGLUTIDE 1.7 MG/.75ML
1.7 INJECTION, SOLUTION SUBCUTANEOUS WEEKLY
Qty: 3 ML | Refills: 0 | Status: SHIPPED | OUTPATIENT
Start: 2024-10-21 | End: 2024-11-20

## 2024-10-21 NOTE — PROGRESS NOTES
Pharmacy Clinic Note    Wesley Chilel is a 73 y.o. male was referred to Clinical Pharmacy Team for weight management/CV risk reduction.    Referring Provider: Hany Penaloza,     Subjective   Allergies   Allergen Reactions    Bee Venom Protein (Honey Bee) Swelling    Penicillins Swelling       [id number] - [place], [place] - [address] [facility] [address]  [address]  [facility] [address]  Phone: [phone] Fax: [phone]    [facility][id number] - [facility] [address] [facility]  [address] [place] [address]  Phone: [phone] Fax: [phone]    [facility]  [address]  [facility] [address]  Phone: [phone] Fax: [phone]      Objective     There were no vitals taken for this visit.     LAB  Lab Results   Component Value Date    BILITOT 0.6 08/06/2024    CALCIUM 8.7 08/06/2024    CO2 29 08/06/2024     08/06/2024    CREATININE 1.16 08/06/2024    GLUCOSE 83 08/06/2024    ALKPHOS 75 08/06/2024    K 4.5 08/06/2024    PROT 6.6 08/06/2024     08/06/2024    AST 16 08/06/2024    ALT 15 08/06/2024    BUN 15 08/06/2024    ANIONGAP 10 08/06/2024    MG 1.67 11/03/2020    PHOS 3.7 10/26/2021    ALBUMIN 4.1 08/06/2024    GFRMALE 73 04/27/2023     Lab Results   Component Value Date    TRIG 115 08/06/2024    CHOL 176 08/06/2024    LDLCALC 111 (H) 08/06/2024    HDL 41.9 08/06/2024     Lab Results   Component Value Date    HGBA1C 5.0 08/06/2024       Current Outpatient Medications on File Prior to Visit   Medication Sig Dispense Refill    atorvastatin (Lipitor) 20 mg tablet Take 1 tablet (20 mg) by mouth once daily. 90 tablet 3    cholecalciferol (Vitamin D-3) 25 MCG (1000 UT) tablet Take 1 tablet (25 mcg) by mouth once daily.      doxepin (SINEquan) 10 mg capsule Take 1 capsule (10 mg) by mouth once daily at bedtime. 90 capsule 3    FLUoxetine (PROzac) 40 mg capsule Take 1 capsule (40 mg) by  mouth once daily. 90 capsule 3    pramipexole (Mirapex) 0.75 mg tablet Take 1 tablet (0.75 mg) by mouth once daily at bedtime. 90 tablet 3    rivaroxaban (Xarelto) 20 mg tablet Take 1 tablet (20 mg) by mouth once daily in the evening. Take with meals. Take with food. 90 tablet 3    traZODone (Desyrel) 150 mg tablet Take 1 tablet (150 mg) by mouth once daily at bedtime. 90 tablet 3    [DISCONTINUED] semaglutide, weight loss, (Wegovy) 1 mg/0.5 mL pen injector Inject 1 mg under the skin 1 (one) time per week. 2 mL 0     No current facility-administered medications on file prior to visit.      Reported weight loss since Wegovy initiation: ~4 lbs    DRUG INTERACTIONS  - No significant drug-drug interactions exist that require change in therapy  _______________________________________________________________________  PATIENT EDUCATION/GOALS    1. Patient reports that he has started Wegovy 1 mg and has taken 2 doses so far. He reports no ill effects, and states the medication feels impactful but slow going. Given positive results, we will increase to Wegovy 1.7 mg weekly which pt will begin on 11/4.  _______________________________________________________________________    Assessment/Plan   Problem List Items Addressed This Visit       Paroxysmal atrial fibrillation (Multi)    Relevant Medications    semaglutide, weight loss, (Wegovy) 1.7 mg/0.75 mL pen injector    Class 2 severe obesity due to excess calories with serious comorbidity and body mass index (BMI) of 38.0 to 38.9 in adult    Relevant Medications    semaglutide, weight loss, (Wegovy) 1.7 mg/0.75 mL pen injector    History of cerebrovascular disease    Relevant Medications    semaglutide, weight loss, (Wegovy) 1.7 mg/0.75 mL pen injector        Continue all meds under the continuation of care with the referring provider and clinical pharmacy team.    Clinical Pharmacist follow-up: monthly    Hai Farr, PharmD     Verbal consent to manage patient's drug  therapy was obtained from [the patient and/or an individual authorized to act on behalf of a patient]. They were informed they may decline to participate or withdraw from participation in pharmacy services at any time.

## 2024-10-23 ENCOUNTER — PHARMACY VISIT (OUTPATIENT)
Dept: PHARMACY | Facility: CLINIC | Age: 74
End: 2024-10-23
Payer: COMMERCIAL

## 2024-11-21 ENCOUNTER — APPOINTMENT (OUTPATIENT)
Dept: PHARMACY | Facility: HOSPITAL | Age: 74
End: 2024-11-21
Payer: MEDICARE

## 2024-11-21 DIAGNOSIS — E66.812 CLASS 2 SEVERE OBESITY DUE TO EXCESS CALORIES WITH SERIOUS COMORBIDITY AND BODY MASS INDEX (BMI) OF 38.0 TO 38.9 IN ADULT: ICD-10-CM

## 2024-11-21 DIAGNOSIS — I48.0 PAROXYSMAL ATRIAL FIBRILLATION (MULTI): ICD-10-CM

## 2024-11-21 DIAGNOSIS — E66.01 CLASS 2 SEVERE OBESITY DUE TO EXCESS CALORIES WITH SERIOUS COMORBIDITY AND BODY MASS INDEX (BMI) OF 38.0 TO 38.9 IN ADULT: ICD-10-CM

## 2024-11-21 DIAGNOSIS — Z86.79 HISTORY OF CEREBROVASCULAR DISEASE: ICD-10-CM

## 2024-11-21 PROCEDURE — RXMED WILLOW AMBULATORY MEDICATION CHARGE

## 2024-11-21 RX ORDER — SEMAGLUTIDE 2.4 MG/.75ML
2.4 INJECTION, SOLUTION SUBCUTANEOUS WEEKLY
Qty: 3 ML | Refills: 11 | Status: SHIPPED | OUTPATIENT
Start: 2024-11-21 | End: 2025-10-23

## 2024-11-21 NOTE — PROGRESS NOTES
Pharmacy Clinic Note    Wesley Chilel is a 73 y.o. male was referred to Clinical Pharmacy Team for weight management/CV risk reduction.    Referring Provider: Hany Penaloza,     Subjective   Allergies   Allergen Reactions    Bee Venom Protein (Honey Bee) Swelling    Penicillins Swelling       [id number] - [place], [place] - [address] AT [facility] [address]  [address]  [facility] [address]  Phone: [phone] Fax: [phone]    [facility] #[id number] - [facility] [address] [facility]  [address] [place] [address]  Phone: [phone] Fax: [phone]    [facility]  [address]  [facility] [address]  Phone: [phone] Fax: [phone]      Objective     There were no vitals taken for this visit.     LAB  Lab Results   Component Value Date    BILITOT 0.6 08/06/2024    CALCIUM 8.7 08/06/2024    CO2 29 08/06/2024     08/06/2024    CREATININE 1.16 08/06/2024    GLUCOSE 83 08/06/2024    ALKPHOS 75 08/06/2024    K 4.5 08/06/2024    PROT 6.6 08/06/2024     08/06/2024    AST 16 08/06/2024    ALT 15 08/06/2024    BUN 15 08/06/2024    ANIONGAP 10 08/06/2024    MG 1.67 11/03/2020    PHOS 3.7 10/26/2021    ALBUMIN 4.1 08/06/2024    GFRMALE 73 04/27/2023     Lab Results   Component Value Date    TRIG 115 08/06/2024    CHOL 176 08/06/2024    LDLCALC 111 (H) 08/06/2024    HDL 41.9 08/06/2024     Lab Results   Component Value Date    HGBA1C 5.0 08/06/2024       Current Outpatient Medications on File Prior to Visit   Medication Sig Dispense Refill    atorvastatin (Lipitor) 20 mg tablet Take 1 tablet (20 mg) by mouth once daily. 90 tablet 3    cholecalciferol (Vitamin D-3) 25 MCG (1000 UT) tablet Take 1 tablet (25 mcg) by mouth once daily.      doxepin (SINEquan) 10 mg capsule Take 1 capsule (10 mg) by mouth once daily at bedtime. 90 capsule 3    FLUoxetine (PROzac) 40 mg capsule Take 1 capsule (40 mg) by  mouth once daily. 90 capsule 3    pramipexole (Mirapex) 0.75 mg tablet Take 1 tablet (0.75 mg) by mouth once daily at bedtime. 90 tablet 3    rivaroxaban (Xarelto) 20 mg tablet Take 1 tablet (20 mg) by mouth once daily in the evening. Take with meals. Take with food. 90 tablet 3    traZODone (Desyrel) 150 mg tablet Take 1 tablet (150 mg) by mouth once daily at bedtime. 90 tablet 3    [DISCONTINUED] semaglutide, weight loss, (Wegovy) 1.7 mg/0.75 mL pen injector Inject 1.7 mg under the skin 1 (one) time per week for 4 doses. 3 mL 0     No current facility-administered medications on file prior to visit.      Reported weight loss since Wegovy initiation: ~4 lbs    DRUG INTERACTIONS  - No significant drug-drug interactions exist that require change in therapy  _______________________________________________________________________  PATIENT EDUCATION/GOALS    1. Patient reports that he has started Wegovy 1.7 mg and has taken 3 doses so far. He reports no ill effects, and states the medication feels impactful but slow going and has lost 11 lbs total. Given positive results, we will increase to Wegovy 2.4 mg weekly which pt will begin in 2 weeks.  _______________________________________________________________________    Assessment/Plan   Problem List Items Addressed This Visit       Paroxysmal atrial fibrillation (Multi)    Relevant Medications    semaglutide, weight loss, (Wegovy) 2.4 mg/0.75 mL pen injector    Class 2 severe obesity due to excess calories with serious comorbidity and body mass index (BMI) of 38.0 to 38.9 in adult    Relevant Medications    semaglutide, weight loss, (Wegovy) 2.4 mg/0.75 mL pen injector    History of cerebrovascular disease    Relevant Medications    semaglutide, weight loss, (Wegovy) 2.4 mg/0.75 mL pen injector        Continue all meds under the continuation of care with the referring provider and clinical pharmacy team.    Clinical Pharmacist follow-up: monthly    Hai Farr, PharmD      Verbal consent to manage patient's drug therapy was obtained from [the patient and/or an individual authorized to act on behalf of a patient]. They were informed they may decline to participate or withdraw from participation in pharmacy services at any time.

## 2024-11-25 ENCOUNTER — PHARMACY VISIT (OUTPATIENT)
Dept: PHARMACY | Facility: CLINIC | Age: 74
End: 2024-11-25
Payer: COMMERCIAL

## 2024-12-19 ENCOUNTER — APPOINTMENT (OUTPATIENT)
Dept: PHARMACY | Facility: HOSPITAL | Age: 74
End: 2024-12-19
Payer: MEDICARE

## 2024-12-19 DIAGNOSIS — E66.01 CLASS 2 SEVERE OBESITY DUE TO EXCESS CALORIES WITH SERIOUS COMORBIDITY AND BODY MASS INDEX (BMI) OF 38.0 TO 38.9 IN ADULT: ICD-10-CM

## 2024-12-19 DIAGNOSIS — I48.0 PAROXYSMAL ATRIAL FIBRILLATION (MULTI): ICD-10-CM

## 2024-12-19 DIAGNOSIS — E66.812 CLASS 2 SEVERE OBESITY DUE TO EXCESS CALORIES WITH SERIOUS COMORBIDITY AND BODY MASS INDEX (BMI) OF 38.0 TO 38.9 IN ADULT: ICD-10-CM

## 2024-12-19 DIAGNOSIS — Z86.79 HISTORY OF CEREBROVASCULAR DISEASE: ICD-10-CM

## 2024-12-19 NOTE — PROGRESS NOTES
Pharmacy Clinic Note    Wesley Chilel is a 74 y.o. male was referred to Clinical Pharmacy Team for weight management/CV risk reduction.    Referring Provider: Hany Penaloza,     Subjective   Allergies   Allergen Reactions    Bee Venom Protein (Honey Bee) Swelling    Penicillins Swelling       [id number] - [place], [place] - [address] [facility] [address]  [address] [email or web address] [address]  [facility] [address]  Phone: [phone] Fax: [phone]    [facility] #[id number] - [facility] [address] [facility]  [address] [place] [address]  Phone: [phone] Fax: [phone]    [facility]  [address], [facility] [address]  [facility] [address]  Phone: [phone] Fax: [phone]      Objective     There were no vitals taken for this visit.     LAB  Lab Results   Component Value Date    BILITOT 0.6 08/06/2024    CALCIUM 8.7 08/06/2024    CO2 29 08/06/2024     08/06/2024    CREATININE 1.16 08/06/2024    GLUCOSE 83 08/06/2024    ALKPHOS 75 08/06/2024    K 4.5 08/06/2024    PROT 6.6 08/06/2024     08/06/2024    AST 16 08/06/2024    ALT 15 08/06/2024    BUN 15 08/06/2024    ANIONGAP 10 08/06/2024    MG 1.67 11/03/2020    PHOS 3.7 10/26/2021    ALBUMIN 4.1 08/06/2024    GFRMALE 73 04/27/2023     Lab Results   Component Value Date    TRIG 115 08/06/2024    CHOL 176 08/06/2024    LDLCALC 111 (H) 08/06/2024    HDL 41.9 08/06/2024     Lab Results   Component Value Date    HGBA1C 5.0 08/06/2024       Current Outpatient Medications on File Prior to Visit   Medication Sig Dispense Refill    atorvastatin (Lipitor) 20 mg tablet Take 1 tablet (20 mg) by mouth once daily. 90 tablet 3    cholecalciferol (Vitamin D-3) 25 MCG (1000 UT) tablet Take 1 tablet (25 mcg) by mouth once daily.      doxepin (SINEquan) 10 mg capsule Take 1 capsule (10 mg) by mouth once daily at bedtime. 90 capsule 3    FLUoxetine (PROzac) 40 mg capsule Take 1 capsule (40 mg) by  mouth once daily. 90 capsule 3    pramipexole (Mirapex) 0.75 mg tablet Take 1 tablet (0.75 mg) by mouth once daily at bedtime. 90 tablet 3    rivaroxaban (Xarelto) 20 mg tablet Take 1 tablet (20 mg) by mouth once daily in the evening. Take with meals. Take with food. 90 tablet 3    semaglutide, weight loss, (Wegovy) 2.4 mg/0.75 mL pen injector Inject 2.4 mg under the skin 1 (one) time per week. 3 mL 11    traZODone (Desyrel) 150 mg tablet Take 1 tablet (150 mg) by mouth once daily at bedtime. 90 tablet 3     No current facility-administered medications on file prior to visit.      Reported weight loss since Wegovy initiation: ~14 lbs    DRUG INTERACTIONS  - No significant drug-drug interactions exist that require change in therapy  _______________________________________________________________________  PATIENT EDUCATION/GOALS    1. Patient reports that he has started Wegovy 2.4 mg and has taken 3 doses so far. He reports no ill effects, and states the medication feels impactful but slow going and has lost 114 lbs total. Given positive results, we will continue Wegovy 2.4 mg weekly and follow up after next PCP appointment.  _______________________________________________________________________    Assessment/Plan   Problem List Items Addressed This Visit       Paroxysmal atrial fibrillation (Multi)    Class 2 severe obesity due to excess calories with serious comorbidity and body mass index (BMI) of 38.0 to 38.9 in adult    History of cerebrovascular disease        Continue all meds under the continuation of care with the referring provider and clinical pharmacy team.    Clinical Pharmacist follow-up: monthly    Hai Farr PharmD     Verbal consent to manage patient's drug therapy was obtained from [the patient and/or an individual authorized to act on behalf of a patient]. They were informed they may decline to participate or withdraw from participation in pharmacy services at any time.

## 2024-12-23 PROCEDURE — RXMED WILLOW AMBULATORY MEDICATION CHARGE

## 2024-12-26 ENCOUNTER — PHARMACY VISIT (OUTPATIENT)
Dept: PHARMACY | Facility: CLINIC | Age: 74
End: 2024-12-26
Payer: COMMERCIAL

## 2025-01-15 PROCEDURE — RXMED WILLOW AMBULATORY MEDICATION CHARGE

## 2025-01-20 ENCOUNTER — PHARMACY VISIT (OUTPATIENT)
Dept: PHARMACY | Facility: CLINIC | Age: 75
End: 2025-01-20
Payer: COMMERCIAL

## 2025-02-04 DIAGNOSIS — F51.04 CHRONIC INSOMNIA: ICD-10-CM

## 2025-02-04 DIAGNOSIS — E66.01 CLASS 2 SEVERE OBESITY DUE TO EXCESS CALORIES WITH SERIOUS COMORBIDITY AND BODY MASS INDEX (BMI) OF 36.0 TO 36.9 IN ADULT: ICD-10-CM

## 2025-02-04 DIAGNOSIS — E66.812 CLASS 2 SEVERE OBESITY DUE TO EXCESS CALORIES WITH SERIOUS COMORBIDITY AND BODY MASS INDEX (BMI) OF 36.0 TO 36.9 IN ADULT: ICD-10-CM

## 2025-02-04 RX ORDER — TRAZODONE HYDROCHLORIDE 150 MG/1
150 TABLET ORAL NIGHTLY
Qty: 90 TABLET | Refills: 3 | Status: SHIPPED | OUTPATIENT
Start: 2025-02-04 | End: 2026-02-04

## 2025-02-12 LAB
ALBUMIN SERPL-MCNC: 4.2 G/DL (ref 3.6–5.1)
ALP SERPL-CCNC: 75 U/L (ref 35–144)
ALT SERPL-CCNC: 20 U/L (ref 9–46)
ANION GAP SERPL CALCULATED.4IONS-SCNC: 9 MMOL/L (CALC) (ref 7–17)
AST SERPL-CCNC: 19 U/L (ref 10–35)
BILIRUB SERPL-MCNC: 0.6 MG/DL (ref 0.2–1.2)
BUN SERPL-MCNC: 14 MG/DL (ref 7–25)
CALCIUM SERPL-MCNC: 9 MG/DL (ref 8.6–10.3)
CHLORIDE SERPL-SCNC: 102 MMOL/L (ref 98–110)
CHOLEST SERPL-MCNC: 206 MG/DL
CHOLEST/HDLC SERPL: 4.4 (CALC)
CO2 SERPL-SCNC: 29 MMOL/L (ref 20–32)
CREAT SERPL-MCNC: 1.08 MG/DL (ref 0.7–1.28)
EGFRCR SERPLBLD CKD-EPI 2021: 72 ML/MIN/1.73M2
EST. AVERAGE GLUCOSE BLD GHB EST-MCNC: 100 MG/DL
EST. AVERAGE GLUCOSE BLD GHB EST-SCNC: 5.5 MMOL/L
GLUCOSE SERPL-MCNC: 88 MG/DL (ref 65–99)
HBA1C MFR BLD: 5.1 % OF TOTAL HGB
HDLC SERPL-MCNC: 47 MG/DL
LDLC SERPL CALC-MCNC: 136 MG/DL (CALC)
NONHDLC SERPL-MCNC: 159 MG/DL (CALC)
POTASSIUM SERPL-SCNC: 4.9 MMOL/L (ref 3.5–5.3)
PROT SERPL-MCNC: 6.8 G/DL (ref 6.1–8.1)
SODIUM SERPL-SCNC: 140 MMOL/L (ref 135–146)
TRIGL SERPL-MCNC: 115 MG/DL

## 2025-02-13 ENCOUNTER — APPOINTMENT (OUTPATIENT)
Dept: PRIMARY CARE | Facility: CLINIC | Age: 75
End: 2025-02-13
Payer: MEDICARE

## 2025-02-13 VITALS
SYSTOLIC BLOOD PRESSURE: 128 MMHG | BODY MASS INDEX: 36.37 KG/M2 | HEART RATE: 72 BPM | DIASTOLIC BLOOD PRESSURE: 70 MMHG | WEIGHT: 240 LBS | HEIGHT: 68 IN

## 2025-02-13 DIAGNOSIS — E78.5 DYSLIPIDEMIA, GOAL LDL BELOW 70: ICD-10-CM

## 2025-02-13 DIAGNOSIS — E66.812 CLASS 2 SEVERE OBESITY DUE TO EXCESS CALORIES WITH SERIOUS COMORBIDITY AND BODY MASS INDEX (BMI) OF 36.0 TO 36.9 IN ADULT: ICD-10-CM

## 2025-02-13 DIAGNOSIS — F32.5 MAJOR DEPRESSIVE DISORDER WITH SINGLE EPISODE, IN FULL REMISSION (CMS-HCC): ICD-10-CM

## 2025-02-13 DIAGNOSIS — Z00.00 MEDICARE ANNUAL WELLNESS VISIT, SUBSEQUENT: Primary | ICD-10-CM

## 2025-02-13 DIAGNOSIS — Z12.11 COLON CANCER SCREENING: ICD-10-CM

## 2025-02-13 DIAGNOSIS — I48.0 PAROXYSMAL ATRIAL FIBRILLATION (MULTI): ICD-10-CM

## 2025-02-13 DIAGNOSIS — F51.04 CHRONIC INSOMNIA: ICD-10-CM

## 2025-02-13 DIAGNOSIS — G25.81 RESTLESS LEGS SYNDROME: ICD-10-CM

## 2025-02-13 DIAGNOSIS — I49.5 SINUS NODE DYSFUNCTION (MULTI): ICD-10-CM

## 2025-02-13 DIAGNOSIS — Z00.00 ROUTINE GENERAL MEDICAL EXAMINATION AT HEALTH CARE FACILITY: ICD-10-CM

## 2025-02-13 DIAGNOSIS — Z86.79 HISTORY OF CEREBROVASCULAR DISEASE: ICD-10-CM

## 2025-02-13 DIAGNOSIS — E66.01 CLASS 2 SEVERE OBESITY DUE TO EXCESS CALORIES WITH SERIOUS COMORBIDITY AND BODY MASS INDEX (BMI) OF 36.0 TO 36.9 IN ADULT: ICD-10-CM

## 2025-02-13 PROCEDURE — 1123F ACP DISCUSS/DSCN MKR DOCD: CPT | Performed by: INTERNAL MEDICINE

## 2025-02-13 PROCEDURE — 1158F ADVNC CARE PLAN TLK DOCD: CPT | Performed by: INTERNAL MEDICINE

## 2025-02-13 PROCEDURE — G0444 DEPRESSION SCREEN ANNUAL: HCPCS | Performed by: INTERNAL MEDICINE

## 2025-02-13 PROCEDURE — 99497 ADVNCD CARE PLAN 30 MIN: CPT | Performed by: INTERNAL MEDICINE

## 2025-02-13 PROCEDURE — G0439 PPPS, SUBSEQ VISIT: HCPCS | Performed by: INTERNAL MEDICINE

## 2025-02-13 PROCEDURE — G0446 INTENS BEHAVE THER CARDIO DX: HCPCS | Performed by: INTERNAL MEDICINE

## 2025-02-13 PROCEDURE — 99214 OFFICE O/P EST MOD 30 MIN: CPT | Performed by: INTERNAL MEDICINE

## 2025-02-13 PROCEDURE — 1170F FXNL STATUS ASSESSED: CPT | Performed by: INTERNAL MEDICINE

## 2025-02-13 PROCEDURE — G0447 BEHAVIOR COUNSEL OBESITY 15M: HCPCS | Performed by: INTERNAL MEDICINE

## 2025-02-13 PROCEDURE — 1160F RVW MEDS BY RX/DR IN RCRD: CPT | Performed by: INTERNAL MEDICINE

## 2025-02-13 PROCEDURE — 1159F MED LIST DOCD IN RCRD: CPT | Performed by: INTERNAL MEDICINE

## 2025-02-13 PROCEDURE — 1036F TOBACCO NON-USER: CPT | Performed by: INTERNAL MEDICINE

## 2025-02-13 RX ORDER — ROSUVASTATIN CALCIUM 40 MG/1
40 TABLET, COATED ORAL DAILY
Qty: 100 TABLET | Refills: 3 | Status: SHIPPED | OUTPATIENT
Start: 2025-02-13 | End: 2026-03-20

## 2025-02-13 RX ORDER — FLUOXETINE HYDROCHLORIDE 40 MG/1
40 CAPSULE ORAL DAILY
Qty: 90 CAPSULE | Refills: 3 | Status: SHIPPED | OUTPATIENT
Start: 2025-02-13 | End: 2026-02-13

## 2025-02-13 RX ORDER — DOXEPIN HYDROCHLORIDE 10 MG/1
10 CAPSULE ORAL NIGHTLY
Qty: 90 CAPSULE | Refills: 3 | Status: SHIPPED | OUTPATIENT
Start: 2025-02-13 | End: 2026-02-13

## 2025-02-13 ASSESSMENT — ACTIVITIES OF DAILY LIVING (ADL)
BATHING: INDEPENDENT
GROCERY_SHOPPING: INDEPENDENT
MANAGING_FINANCES: INDEPENDENT
TAKING_MEDICATION: INDEPENDENT
DRESSING: INDEPENDENT
DOING_HOUSEWORK: INDEPENDENT

## 2025-02-13 ASSESSMENT — COLUMBIA-SUICIDE SEVERITY RATING SCALE - C-SSRS
1. IN THE PAST MONTH, HAVE YOU WISHED YOU WERE DEAD OR WISHED YOU COULD GO TO SLEEP AND NOT WAKE UP?: NO
2. HAVE YOU ACTUALLY HAD ANY THOUGHTS OF KILLING YOURSELF?: NO
6. HAVE YOU EVER DONE ANYTHING, STARTED TO DO ANYTHING, OR PREPARED TO DO ANYTHING TO END YOUR LIFE?: NO

## 2025-02-13 ASSESSMENT — ENCOUNTER SYMPTOMS
OCCASIONAL FEELINGS OF UNSTEADINESS: 0
LOSS OF SENSATION IN FEET: 0
DEPRESSION: 0

## 2025-02-13 ASSESSMENT — PATIENT HEALTH QUESTIONNAIRE - PHQ9
2. FEELING DOWN, DEPRESSED OR HOPELESS: NOT AT ALL
SUM OF ALL RESPONSES TO PHQ9 QUESTIONS 1 AND 2: 0
1. LITTLE INTEREST OR PLEASURE IN DOING THINGS: NOT AT ALL

## 2025-02-13 NOTE — ASSESSMENT & PLAN NOTE
Continues on fluoxetine 40 mg daily stable  Orders:    FLUoxetine (PROzac) 40 mg capsule; Take 1 capsule (40 mg) by mouth once daily.

## 2025-02-13 NOTE — ASSESSMENT & PLAN NOTE
Discussed advanced medical directives with wife as medical power of .  Encourage vaccination with influenza and COVID-19 as well as RSV at pharmacy patient defers influenza vaccine at this time reevaluate 6 months

## 2025-02-13 NOTE — ASSESSMENT & PLAN NOTE
Stable normal sinus rhythm continue rivaroxaban 20 mg daily rate and rhythm controlled at this time status post ablation  Orders:    Follow Up In [facility] - PCP - Established    Follow Up In [facility] - PCP - Established; Future    Comprehensive Metabolic Panel; Future    Lipid Panel; Future    Albumin-Creatinine Ratio, Urine Random; Future

## 2025-02-13 NOTE — ASSESSMENT & PLAN NOTE
Improvement with use of pramipexole 0.75 mg nightly  Orders:    Follow Up In [facility] - PCP - Established

## 2025-02-13 NOTE — PROGRESS NOTES
Subjective   Reason for Visit: Wesley Chilel is an 74 y.o. male here for a Medicare Wellness visit.     Past Medical, Surgical, and Family History reviewed and updated in chart.    Reviewed all medications by prescribing practitioner or clinical pharmacist (such as prescriptions, OTCs, herbal therapies and supplements) and documented in the medical record.    HPI    Patient Care Team:  Hany Penaloza DO as PCP - General  Hany Penaloza DO as PCP - [facility] ACO Attributed Provider     Review of Systems   All other systems reviewed and are negative.      Objective   Vitals:  There were no vitals taken for this visit.      Physical Exam  Vitals and nursing note reviewed.   Constitutional:       General: He is not in acute distress.     Appearance: Normal appearance. He is well-developed. He is not toxic-appearing.   HENT:      Head: Normocephalic and atraumatic.      Right Ear: Tympanic membrane and external ear normal.      Left Ear: Tympanic membrane and external ear normal.      Nose: Nose normal.      Mouth/Throat:      Mouth: Mucous membranes are moist.      Pharynx: Oropharynx is clear. No oropharyngeal exudate or posterior oropharyngeal erythema.      Tonsils: No tonsillar exudate. 2+ on the right. 2+ on the left.   Eyes:      Extraocular Movements: Extraocular movements intact.      Conjunctiva/sclera: Conjunctivae normal.   Cardiovascular:      Rate and Rhythm: Normal rate and regular rhythm.      Pulses: Normal pulses.      Heart sounds: Normal heart sounds. No murmur heard.  Pulmonary:      Effort: Pulmonary effort is normal.      Breath sounds: Normal breath sounds.   Abdominal:      General: Abdomen is flat. Bowel sounds are normal.      Palpations: Abdomen is soft.   Musculoskeletal:      Cervical back: Neck supple.   Feet:      Right foot:      Skin integrity: Skin integrity normal. No ulcer, blister, skin breakdown, erythema, warmth or callus.      Toenail Condition: Right toenails are normal.       Left foot:      Skin integrity: Skin integrity normal. No ulcer, blister, skin breakdown, erythema, warmth or callus.      Toenail Condition: Left toenails are normal.   Lymphadenopathy:      Cervical: No cervical adenopathy.   Skin:     General: Skin is warm and dry.      Capillary Refill: Capillary refill takes more than 3 seconds.      Findings: No rash.   Neurological:      Mental Status: He is alert. Mental status is at baseline.      Sensory: Sensation is intact.   Psychiatric:         Mood and Affect: Mood normal.         Behavior: Behavior normal.         Thought Content: Thought content normal.         Judgment: Judgment normal.         Assessment & Plan  Paroxysmal atrial fibrillation (Multi)    Orders:    Follow Up In [facility] - PCP - Established    Restless legs syndrome    Orders:    Follow Up In [facility] - PCP - Established    Sinus node dysfunction (Multi)    Orders:    Follow Up In [facility] - PCP - Established    History of cerebrovascular disease    Orders:    Follow Up In [facility] - PCP - Established    Class 2 severe obesity due to excess calories with serious comorbidity and body mass index (BMI) of 38.0 to 38.9 in adult    Orders:    Follow Up In [facility] - PCP - Established    Colon cancer screening    Orders:    Cologuard® colon cancer screening; Future    Medicare annual wellness visit, subsequent                Advance Directives Discussion  16 - 20 minutes were spent discussing Advanced Care Planning (including a Living Will, Medical Power Of , as well as specific end of life choices and/or directives). The details of that discussion were documented in Advanced Directives Discussion section of the medical record.     Cardiac Risk Assessment  15 - 20 minutes were spent discussing Cardiovascular risk and, if needed, lifestyle modifications were recommended, including nutritional choices, exercise, and elimination of  habits contributing to risk.   Aspirin use/disuse was discussed following the guidelines below:  low dose ASA ( mg) should be considered:    If prior Heart Attack/Stroke/Peripheral vascular disease:  Generally recommend daily low dose aspirin unless extremely high bleeding risk (e.g., gastrointestinal).    If no prior Heart Attack/Stroke/Peripheral vascular disease:              Age over 70: Do not use Aspirin for prevention    Age less than 70 and 10-year cardiovascular disease risk is >20%: use low dose Aspirin for prevention.                 Depression Screening  5 - 10 minutes were spent screening for depression.    Obesity Counseling  15 - 20 minutes were spent counseling on diet and exercise interventions to address obesity and weight reduction.

## 2025-02-13 NOTE — ASSESSMENT & PLAN NOTE
Continues with pacemaker stable at this time  Orders:    Follow Up In [facility] - PCP - Established

## 2025-02-13 NOTE — ASSESSMENT & PLAN NOTE
Continue aspirin 81 mg daily  Orders:    Follow Up In [facility] - PCP - Established    Follow Up In [facility] - PCP - Established; Future    Comprehensive Metabolic Panel; Future    Lipid Panel; Future    Albumin-Creatinine Ratio, Urine Random; Future

## 2025-02-13 NOTE — ASSESSMENT & PLAN NOTE
Refill doxepin 10 mg nightly at bedtime  Orders:    doxepin (SINEquan) 10 mg capsule; Take 1 capsule (10 mg) by mouth once daily at bedtime.

## 2025-02-13 NOTE — ASSESSMENT & PLAN NOTE
Patient given Cologuard screening for colon cancer screening for next visit  Orders:    Cologuard® colon cancer screening; Future

## 2025-02-13 NOTE — ASSESSMENT & PLAN NOTE
15 pound weight loss with careful titration of semaglutide to 2.4 mg subcu weekly continue with dietary changes and lifestyle management improving BMI to 36.  The patient received Current weight: 109 kg (240 lb)  Weight change since last visit (-) denotes wt loss -15 lbs   Weight loss needed to achieve BMI 25: 75.9 Lbs  Weight loss needed to achieve BMI 30: 43.1 Lbs    Provided instructions on dietary changes  Provided instructions on exercise  Advised to Increase physical activity because they have an above normal BMI.   Orders:    Follow Up In Advanced Primary Care - PCP - Established

## 2025-02-14 PROBLEM — Z00.00 ROUTINE GENERAL MEDICAL EXAMINATION AT HEALTH CARE FACILITY: Status: ACTIVE | Noted: 2025-02-14

## 2025-02-14 NOTE — ASSESSMENT & PLAN NOTE
LDL cholesterol not at goal at 136.  Will replace atorvastatin 20 with rosuvastatin 40 mg 1 tablet daily and reevaluate for LDL goal less than 70  Orders:    Follow Up In Advanced Primary Care - PCP - Established; Future    Comprehensive Metabolic Panel; Future    Lipid Panel; Future    Albumin-Creatinine Ratio, Urine Random; Future    rosuvastatin (Crestor) 40 mg tablet; Take 1 tablet (40 mg) by mouth once daily.

## 2025-02-20 ENCOUNTER — APPOINTMENT (OUTPATIENT)
Dept: PHARMACY | Facility: HOSPITAL | Age: 75
End: 2025-02-20
Payer: MEDICARE

## 2025-02-20 DIAGNOSIS — E66.01 CLASS 2 SEVERE OBESITY DUE TO EXCESS CALORIES WITH SERIOUS COMORBIDITY AND BODY MASS INDEX (BMI) OF 38.0 TO 38.9 IN ADULT: ICD-10-CM

## 2025-02-20 DIAGNOSIS — E66.812 CLASS 2 SEVERE OBESITY DUE TO EXCESS CALORIES WITH SERIOUS COMORBIDITY AND BODY MASS INDEX (BMI) OF 38.0 TO 38.9 IN ADULT: ICD-10-CM

## 2025-02-20 DIAGNOSIS — I48.0 PAROXYSMAL ATRIAL FIBRILLATION (MULTI): ICD-10-CM

## 2025-02-20 DIAGNOSIS — Z86.79 HISTORY OF CEREBROVASCULAR DISEASE: ICD-10-CM

## 2025-02-20 NOTE — PROGRESS NOTES
Pharmacy Clinic Note    Wesley Chilel is a 74 y.o. male was referred to Clinical Pharmacy Team for weight management/CV risk reduction.    Referring Provider: Hany Penaloza DO    Subjective   Allergies   Allergen Reactions    Bee Venom Protein (Honey Bee) Swelling    Penicillins Swelling       [facility] #[id number] - [place], [place] - [address] AT [facility] [address]  [address]  [facility] [address]  Phone: [phone] Fax: [phone]    [facility] #[id number] - [facility] [address] [facility]  [address] [facility] [address]  Phone: [phone] Fax: [phone]    [facility]  [address]  [facility] [address]  Phone: [phone] Fax: [phone]      Objective     There were no vitals taken for this visit.     LAB  Lab Results   Component Value Date    BILITOT 0.6 02/11/2025    CALCIUM 9.0 02/11/2025    CO2 29 02/11/2025     02/11/2025    CREATININE 1.08 02/11/2025    GLUCOSE 88 02/11/2025    ALKPHOS 75 02/11/2025    K 4.9 02/11/2025    PROT 6.8 02/11/2025     02/11/2025    AST 19 02/11/2025    ALT 20 02/11/2025    BUN 14 02/11/2025    ANIONGAP 9 02/11/2025    MG 1.67 11/03/2020    PHOS 3.7 10/26/2021    ALBUMIN 4.2 02/11/2025    GFRMALE 73 04/27/2023     Lab Results   Component Value Date    TRIG 115 02/11/2025    CHOL 206 (H) 02/11/2025    LDLCALC 136 (H) 02/11/2025    HDL 47 02/11/2025     Lab Results   Component Value Date    HGBA1C 5.1 02/11/2025       Current Outpatient Medications on File Prior to Visit   Medication Sig Dispense Refill    cholecalciferol (Vitamin D-3) 25 MCG (1000 UT) tablet Take 1 tablet (25 mcg) by mouth once daily.      doxepin (SINEquan) 10 mg capsule Take 1 capsule (10 mg) by mouth once daily at bedtime. 90 capsule 3    FLUoxetine (PROzac) 40 mg capsule Take 1 capsule (40 mg) by mouth once daily. 90 capsule 3    pramipexole (Mirapex) 0.75 mg tablet Take 1 tablet (0.75 mg)  by mouth once daily at bedtime. 90 tablet 3    rivaroxaban (Xarelto) 20 mg tablet Take 1 tablet (20 mg) by mouth once daily in the evening. Take with meals. Take with food. 90 tablet 3    rosuvastatin (Crestor) 40 mg tablet Take 1 tablet (40 mg) by mouth once daily. 100 tablet 3    semaglutide, weight loss, (Wegovy) 2.4 mg/0.75 mL pen injector Inject 2.4 mg under the skin 1 (one) time per week. 3 mL 11    traZODone (Desyrel) 150 mg tablet Take 1 tablet (150 mg) by mouth once daily at bedtime. 90 tablet 3     No current facility-administered medications on file prior to visit.      Reported weight loss since Wegovy initiation: ~14 lbs    DRUG INTERACTIONS  - No significant drug-drug interactions exist that require change in therapy  _______________________________________________________________________  PATIENT EDUCATION/GOALS    1. Patient reports that he has continued Wegovy 2.4 mg since last visit and has lost 15 lbs total. He reports no ill effects, and states the medication feels impactful but slow going. Given positive results, we will continue Wegovy 2.4 mg weekly and follow up in 3 months  _______________________________________________________________________    Assessment/Plan   Problem List Items Addressed This Visit       Paroxysmal atrial fibrillation (Multi)    History of cerebrovascular disease     Other Visit Diagnoses       Class 2 severe obesity due to excess calories with serious comorbidity and body mass index (BMI) of 38.0 to 38.9 in adult                 Continue all meds under the continuation of care with the referring provider and clinical pharmacy team.    Clinical Pharmacist follow-up: June 5    Hai Farr, PharmD     Verbal consent to manage patient's drug therapy was obtained from [the patient and/or an individual authorized to act on behalf of a patient]. They were informed they may decline to participate or withdraw from participation in pharmacy services at any time.

## 2025-03-13 LAB — NONINV COLON CA DNA+OCC BLD SCRN STL QL: NORMAL

## 2025-03-30 LAB — NONINV COLON CA DNA+OCC BLD SCRN STL QL: NEGATIVE

## 2025-04-10 PROCEDURE — RXMED WILLOW AMBULATORY MEDICATION CHARGE

## 2025-04-11 ENCOUNTER — PHARMACY VISIT (OUTPATIENT)
Dept: PHARMACY | Facility: CLINIC | Age: 75
End: 2025-04-11
Payer: COMMERCIAL

## 2025-05-05 ENCOUNTER — OFFICE VISIT (OUTPATIENT)
Dept: CARDIOLOGY | Facility: HOSPITAL | Age: 75
End: 2025-05-05
Payer: MEDICARE

## 2025-05-05 VITALS
SYSTOLIC BLOOD PRESSURE: 150 MMHG | HEART RATE: 63 BPM | DIASTOLIC BLOOD PRESSURE: 86 MMHG | OXYGEN SATURATION: 98 % | BODY MASS INDEX: 36.37 KG/M2 | WEIGHT: 239.2 LBS

## 2025-05-05 DIAGNOSIS — I48.0 PAROXYSMAL ATRIAL FIBRILLATION (MULTI): ICD-10-CM

## 2025-05-05 DIAGNOSIS — E78.5 DYSLIPIDEMIA, GOAL LDL BELOW 70: Primary | ICD-10-CM

## 2025-05-05 LAB
ATRIAL RATE: 66 BPM
P AXIS: 52 DEGREES
P OFFSET: 177 MS
P ONSET: 124 MS
PR INTERVAL: 174 MS
Q ONSET: 211 MS
QRS COUNT: 11 BEATS
QRS DURATION: 98 MS
QT INTERVAL: 414 MS
QTC CALCULATION(BAZETT): 434 MS
QTC FREDERICIA: 427 MS
R AXIS: 18 DEGREES
T AXIS: 13 DEGREES
T OFFSET: 418 MS
VENTRICULAR RATE: 66 BPM

## 2025-05-05 PROCEDURE — G2211 COMPLEX E/M VISIT ADD ON: HCPCS | Performed by: NURSE PRACTITIONER

## 2025-05-05 PROCEDURE — 1123F ACP DISCUSS/DSCN MKR DOCD: CPT | Performed by: NURSE PRACTITIONER

## 2025-05-05 PROCEDURE — 99214 OFFICE O/P EST MOD 30 MIN: CPT | Performed by: NURSE PRACTITIONER

## 2025-05-05 PROCEDURE — 1036F TOBACCO NON-USER: CPT | Performed by: NURSE PRACTITIONER

## 2025-05-05 PROCEDURE — 93005 ELECTROCARDIOGRAM TRACING: CPT | Performed by: NURSE PRACTITIONER

## 2025-05-05 PROCEDURE — 1159F MED LIST DOCD IN RCRD: CPT | Performed by: NURSE PRACTITIONER

## 2025-05-05 ASSESSMENT — ENCOUNTER SYMPTOMS
ENDOCRINE NEGATIVE: 1
RESPIRATORY NEGATIVE: 1
GASTROINTESTINAL NEGATIVE: 1
CARDIOVASCULAR NEGATIVE: 1
NEUROLOGICAL NEGATIVE: 1
PSYCHIATRIC NEGATIVE: 1
MYALGIAS: 1
EYES NEGATIVE: 1
CONSTITUTIONAL NEGATIVE: 1
HEMATOLOGIC/LYMPHATIC NEGATIVE: 1

## 2025-05-05 NOTE — PATIENT INSTRUCTIONS
CALL WITH ANY QUESTIONS   STOP THE XARELTO   START ELIQUIS 5 MG TWICE A DAY   FOLLOW UP IN ONE YEAR

## 2025-05-05 NOTE — PROGRESS NOTES
Referred by Dr. Armendariz for No chief complaint on file.     History Of Present Illness:    Wesley Chilel is a very pleasant 74 year old gentleman with a history of HTN, PAF (Eliquis) and ANNA, he is here for a follow up visit. The patient is seen in collaboration with Dr. Brown. Mr. Chilel states overall he has been feeling well. He denies chest pain, shortness of breath or heart palpitations. Continues to stay active.    Review of Systems   Constitutional: Negative.   HENT: Negative.     Eyes: Negative.    Cardiovascular: Negative.    Respiratory: Negative.     Endocrine: Negative.    Hematologic/Lymphatic: Negative.    Skin: Negative.    Musculoskeletal:  Positive for muscle weakness and myalgias.   Gastrointestinal: Negative.    Neurological: Negative.    Psychiatric/Behavioral: Negative.        Past Medical History:  He has a past medical history of Abnormal findings on diagnostic imaging of liver and biliary tract (07/14/2021), Abnormal reflex (10/21/2021), Benign paroxysmal vertigo, right ear (06/23/2022), Body mass index (BMI) 35.0-35.9, adult (11/30/2021), Body mass index (BMI) 36.0-36.9, adult (06/23/2022), Body mass index (BMI) 37.0-37.9, adult (07/19/2021), Encounter for other preprocedural examination (10/26/2021), Encounter for preprocedural cardiovascular examination (05/10/2021), Idiopathic chronic gout, unspecified site, without tophus (tophi) (02/11/2021), Impacted cerumen, right ear (03/24/2021), Labyrinthine dysfunction, unspecified ear (08/16/2021), Morbid (severe) obesity due to excess calories (Multi) (12/14/2022), Morbid (severe) obesity due to excess calories (Multi) (08/23/2021), Obstructive sleep apnea (adult) (pediatric) (06/23/2022), Other conditions influencing health status (06/23/2022), Other disorders of electrolyte and fluid balance, not elsewhere classified (11/16/2021), Other specified crystal arthropathies, unspecified site (09/15/2020), Other specified disorders of eustachian  tube, bilateral (07/19/2021), Other specified disorders of thyroid (10/01/2021), Pain in left hand (08/17/2020), Pain in unspecified elbow (11/09/2022), Personal history of other diseases of the circulatory system (11/12/2020), Personal history of other diseases of the circulatory system (11/10/2020), Personal history of other diseases of the circulatory system (08/19/2021), Personal history of other diseases of the musculoskeletal system and connective tissue (11/09/2022), Personal history of other diseases of the musculoskeletal system and connective tissue (11/09/2022), Personal history of other diseases of the nervous system and sense organs, Personal history of other diseases of urinary system (09/13/2021), Personal history of other specified conditions (02/05/2018), Personal history of other specified conditions (07/19/2021), Personal history of other specified conditions (12/14/2022), Personal history of other specified conditions (11/30/2020), Personal history of other specified conditions (11/16/2021), Personal history of other specified conditions (09/13/2021), Personal history of other specified conditions (09/13/2021), Personal history of other specified conditions (11/05/2021), Personal history of urinary (tract) infections (06/09/2021), Transient cerebral ischemic attack, unspecified (02/11/2021), Trigger thumb, unspecified thumb (11/09/2022), Unspecified disorder of vestibular function, unspecified ear (10/26/2021), Unspecified visual disturbance (10/06/2021), Unsteadiness on feet (11/16/2021), and Vestibulopathy (05/01/2023).    Past Surgical History:  He has a past surgical history that includes Other surgical history (07/19/2021); Other surgical history (07/19/2021); Other surgical history (07/19/2021); Other surgical history (07/19/2021); Other surgical history (07/19/2021); MR angio head wo IV contrast (11/3/2020); and MR angio neck wo IV contrast (8/27/2021).      Social History:  He reports  that he has never smoked. He has never used smokeless tobacco. He reports that he does not drink alcohol and does not use drugs.    Family History:  Family History   Problem Relation Name Age of Onset    Parkinsonism Other      Aortic stenosis Other      Lymphoma Other          Allergies:  Bee venom protein (honey bee) and Penicillins    Outpatient Medications:  Current Outpatient Medications   Medication Instructions    cholecalciferol (VITAMIN D-3) 25 mcg, Daily    doxepin (SINEQUAN) 10 mg, oral, Nightly    FLUoxetine (PROZAC) 40 mg, oral, Daily    pramipexole (MIRAPEX) 0.75 mg, oral, Nightly    rivaroxaban (XARELTO) 20 mg, oral, Daily with evening meal, Take with food.    rosuvastatin (CRESTOR) 40 mg, oral, Daily    traZODone (DESYREL) 150 mg, oral, Nightly    Wegovy 2.4 mg, subcutaneous, Weekly        Last Recorded Vitals:  Vitals:    05/05/25 1019   BP: 150/86   Pulse: 63   SpO2: 98%   Weight: 109 kg (239 lb 3.2 oz)         Physical Exam:  Physical Exam  Vitals reviewed.   HENT:      Head: Normocephalic.      Nose: Nose normal.   Eyes:      Pupils: Pupils are equal, round, and reactive to light.   Cardiovascular:      Rate and Rhythm: Normal rate and regular rhythm.   Pulmonary:      Effort: Pulmonary effort is normal.      Breath sounds: Normal breath sounds.   Abdominal:      General: Abdomen is flat.      Palpations: Abdomen is soft.   Musculoskeletal:         General: Normal range of motion.      Cervical back: Normal range of motion.   Skin:     General: Skin is warm and dry.   Neurological:      General: No focal deficit present.      Mental Status: He is alert and oriented to person, place, and time.   Psychiatric:         Mood and Affect: Mood normal.       Neck supple no JVP +bruit        Last Labs:  CBC -  Lab Results   Component Value Date    WBC 6.2 01/17/2024    HGB 15.4 01/17/2024    HCT 46.1 01/17/2024    MCV 89 01/17/2024     01/17/2024       CMP -  Lab Results   Component Value Date     CALCIUM 9.0 02/11/2025    PHOS 3.7 10/26/2021    PROT 6.8 02/11/2025    ALBUMIN 4.2 02/11/2025    AST 19 02/11/2025    ALT 20 02/11/2025    ALKPHOS 75 02/11/2025    BILITOT 0.6 02/11/2025       LIPID PANEL -   Lab Results   Component Value Date    CHOL 206 (H) 02/11/2025    TRIG 115 02/11/2025    HDL 47 02/11/2025    CHHDL 4.4 02/11/2025    LDLF 84 04/27/2023    VLDL 23 08/06/2024    NHDL 159 (H) 02/11/2025       RENAL FUNCTION PANEL -   Lab Results   Component Value Date    GLUCOSE 88 02/11/2025     02/11/2025    K 4.9 02/11/2025     02/11/2025    CO2 29 02/11/2025    ANIONGAP 9 02/11/2025    BUN 14 02/11/2025    CREATININE 1.08 02/11/2025    GFRMALE 73 04/27/2023    CALCIUM 9.0 02/11/2025    PHOS 3.7 10/26/2021    ALBUMIN 4.2 02/11/2025        Lab Results   Component Value Date    BNP 16 02/04/2018    HGBA1C 5.1 02/11/2025       Last Cardiology Tests:  ECG:  EKG independently reviewed from today showed sinus rhythm heart rate heart rate 66 bpm     Echo:  Echocardiogram 11/3/2020   1. The left ventricular systolic function is normal with a 55-60% estimated  ejection fraction.  2. Spectral Doppler shows an abnormal pattern of left ventricular diastolic  filling.  3. There is mild aortic valve regurgitation.  4. There is mild mitral regurgitation.     Ejection Fractions:  LVEF 55-60%  Cath:    Stress Test:      Cardiac Imaging:          Assessment/Plan   Mr. Chilel is a very pleasant 74 year old gentleman with a history of HTN, PAF (Eliquis) and ANNA noncompliant with CPAP, he continues to do well from a cardiac standpoint. He continues to stay active. EKG today shows sinus rhythm.  Heart rate and blood pressure is well controlled today.      Plan   -call with any questions   -start Eliquis 5 mg twice a day   -follow up in one year   -continue Crestor 40 mg daily   -stop the xarelto         Valerie Hodges, APRN-CNP

## 2025-06-05 ENCOUNTER — APPOINTMENT (OUTPATIENT)
Dept: PHARMACY | Facility: HOSPITAL | Age: 75
End: 2025-06-05
Payer: MEDICARE

## 2025-06-05 DIAGNOSIS — I48.0 PAROXYSMAL ATRIAL FIBRILLATION (MULTI): ICD-10-CM

## 2025-06-05 DIAGNOSIS — Z86.79 HISTORY OF CEREBROVASCULAR DISEASE: ICD-10-CM

## 2025-06-05 DIAGNOSIS — E66.01 CLASS 2 SEVERE OBESITY DUE TO EXCESS CALORIES WITH SERIOUS COMORBIDITY AND BODY MASS INDEX (BMI) OF 38.0 TO 38.9 IN ADULT: ICD-10-CM

## 2025-06-05 DIAGNOSIS — E66.812 CLASS 2 SEVERE OBESITY DUE TO EXCESS CALORIES WITH SERIOUS COMORBIDITY AND BODY MASS INDEX (BMI) OF 38.0 TO 38.9 IN ADULT: ICD-10-CM

## 2025-06-05 NOTE — PROGRESS NOTES
Pharmacy Clinic Note    Wesley Chilel is a 74 y.o. male was referred to Clinical Pharmacy Team for weight management/CV risk reduction.    Referring Provider: Hany Penaloza DO    Subjective   Allergies   Allergen Reactions    Bee Venom Protein (Honey Bee) Swelling    Penicillins Swelling       [id number] - [place], [place] - [address]  [address] [address]  Phone: [phone] Fax: [phone]    [facility]  [address], [address]  [facility] [address]  Phone: [phone] Fax: [phone]    [id number] - [place], [place] - [address]  [address]  [facility] [address]  Phone: [phone] Fax: [phone]      Objective     There were no vitals taken for this visit.     LAB  Lab Results   Component Value Date    BILITOT 0.6 02/11/2025    CALCIUM 9.0 02/11/2025    CO2 29 02/11/2025     02/11/2025    CREATININE 1.08 02/11/2025    GLUCOSE 88 02/11/2025    ALKPHOS 75 02/11/2025    K 4.9 02/11/2025    PROT 6.8 02/11/2025     02/11/2025    AST 19 02/11/2025    ALT 20 02/11/2025    BUN 14 02/11/2025    ANIONGAP 9 02/11/2025    MG 1.67 11/03/2020    PHOS 3.7 10/26/2021    ALBUMIN 4.2 02/11/2025    GFRMALE 73 04/27/2023     Lab Results   Component Value Date    TRIG 115 02/11/2025    CHOL 206 (H) 02/11/2025    LDLCALC 136 (H) 02/11/2025    HDL 47 02/11/2025     Lab Results   Component Value Date    HGBA1C 5.1 02/11/2025       Current Outpatient Medications on File Prior to Visit   Medication Sig Dispense Refill    apixaban (Eliquis) 5 mg tablet Take 1 tablet (5 mg) by mouth 2 times a day. 180 tablet 3    cholecalciferol (Vitamin D-3) 25 MCG (1000 UT) tablet Take 1 tablet (25 mcg) by mouth once daily.      doxepin (SINEquan) 10 mg capsule Take 1 capsule (10 mg) by mouth once daily at bedtime. 90 capsule 3    FLUoxetine (PROzac) 40 mg capsule Take 1 capsule (40 mg) by mouth once daily. 90 capsule 3    pramipexole (Mirapex) 0.75  mg tablet Take 1 tablet (0.75 mg) by mouth once daily at bedtime. 90 tablet 3    rosuvastatin (Crestor) 40 mg tablet Take 1 tablet (40 mg) by mouth once daily. 100 tablet 3    semaglutide, weight loss, (Wegovy) 2.4 mg/0.75 mL pen injector Inject 2.4 mg under the skin 1 (one) time per week. 3 mL 11    traZODone (Desyrel) 150 mg tablet Take 1 tablet (150 mg) by mouth once daily at bedtime. 90 tablet 3     No current facility-administered medications on file prior to visit.      Reported weight loss since Wegovy initiation: ~14 lbs    DRUG INTERACTIONS  - No significant drug-drug interactions exist that require change in therapy  _______________________________________________________________________  PATIENT EDUCATION/GOALS    1. Patient reports that he has continued Wegovy 2.4 mg since last visit and has lost 15 lbs total. He reports no ill effects, and states the medication feels like it has stopped helping actively with weight loss. He is aware that he will need to continue dieting and exercising to encourage additional weight reduction. Given positive results, we will continue Wegovy 2.4 mg weekly and follow up in 3 months  _______________________________________________________________________    Assessment/Plan   Problem List Items Addressed This Visit       Paroxysmal atrial fibrillation (Multi)    History of cerebrovascular disease    Continue Wegovy 2.4 mg weekly          Other Visit Diagnoses         Class 2 severe obesity due to excess calories with serious comorbidity and body mass index (BMI) of 38.0 to 38.9 in adult                 Continue all meds under the continuation of care with the referring provider and clinical pharmacy team.    Clinical Pharmacist follow-up: November    Hai Farr, PharmD     Verbal consent to manage patient's drug therapy was obtained from [the patient and/or an individual authorized to act on behalf of a patient]. They were informed they may decline to participate or  withdraw from participation in pharmacy services at any time.

## 2025-07-14 PROCEDURE — RXMED WILLOW AMBULATORY MEDICATION CHARGE

## 2025-07-15 ENCOUNTER — PHARMACY VISIT (OUTPATIENT)
Dept: PHARMACY | Facility: CLINIC | Age: 75
End: 2025-07-15
Payer: COMMERCIAL

## 2025-08-14 ENCOUNTER — APPOINTMENT (OUTPATIENT)
Dept: PRIMARY CARE | Facility: CLINIC | Age: 75
End: 2025-08-14
Payer: MEDICARE

## 2025-08-14 VITALS
DIASTOLIC BLOOD PRESSURE: 82 MMHG | BODY MASS INDEX: 35.98 KG/M2 | SYSTOLIC BLOOD PRESSURE: 120 MMHG | HEART RATE: 61 BPM | HEIGHT: 68 IN | WEIGHT: 237.4 LBS | OXYGEN SATURATION: 95 %

## 2025-08-14 DIAGNOSIS — E66.01 CLASS 2 SEVERE OBESITY DUE TO EXCESS CALORIES WITH SERIOUS COMORBIDITY AND BODY MASS INDEX (BMI) OF 36.0 TO 36.9 IN ADULT: ICD-10-CM

## 2025-08-14 DIAGNOSIS — D86.9 SARCOIDOSIS: ICD-10-CM

## 2025-08-14 DIAGNOSIS — E78.5 DYSLIPIDEMIA, GOAL LDL BELOW 70: ICD-10-CM

## 2025-08-14 DIAGNOSIS — R73.02 IGT (IMPAIRED GLUCOSE TOLERANCE): ICD-10-CM

## 2025-08-14 DIAGNOSIS — Z86.79 HISTORY OF CEREBROVASCULAR DISEASE: ICD-10-CM

## 2025-08-14 DIAGNOSIS — F32.5 MAJOR DEPRESSIVE DISORDER WITH SINGLE EPISODE, IN FULL REMISSION: ICD-10-CM

## 2025-08-14 DIAGNOSIS — G47.33 OSA ON CPAP: ICD-10-CM

## 2025-08-14 DIAGNOSIS — I48.0 PAROXYSMAL ATRIAL FIBRILLATION (MULTI): Primary | ICD-10-CM

## 2025-08-14 DIAGNOSIS — I49.5 SINUS NODE DYSFUNCTION (MULTI): ICD-10-CM

## 2025-08-14 DIAGNOSIS — E66.812 CLASS 2 SEVERE OBESITY DUE TO EXCESS CALORIES WITH SERIOUS COMORBIDITY AND BODY MASS INDEX (BMI) OF 36.0 TO 36.9 IN ADULT: ICD-10-CM

## 2025-08-14 DIAGNOSIS — G25.81 RESTLESS LEGS SYNDROME: ICD-10-CM

## 2025-08-14 DIAGNOSIS — H34.8332 TRIBUTARY (BRANCH) RETINAL VEIN OCCLUSION, BILATERAL, STABLE: ICD-10-CM

## 2025-08-14 PROBLEM — N45.1 EPIDIDYMITIS, LEFT: Status: RESOLVED | Noted: 2023-05-01 | Resolved: 2025-08-14

## 2025-08-14 LAB
ALBUMIN SERPL-MCNC: 4 G/DL (ref 3.6–5.1)
ALBUMIN/CREAT UR: 68 MG/G CREAT
ALP SERPL-CCNC: 67 U/L (ref 35–144)
ALT SERPL-CCNC: 20 U/L (ref 9–46)
ANION GAP SERPL CALCULATED.4IONS-SCNC: 8 MMOL/L (CALC) (ref 7–17)
AST SERPL-CCNC: 21 U/L (ref 10–35)
BILIRUB SERPL-MCNC: 0.8 MG/DL (ref 0.2–1.2)
BUN SERPL-MCNC: 17 MG/DL (ref 7–25)
CALCIUM SERPL-MCNC: 8.9 MG/DL (ref 8.6–10.3)
CHLORIDE SERPL-SCNC: 105 MMOL/L (ref 98–110)
CHOLEST SERPL-MCNC: 158 MG/DL
CHOLEST/HDLC SERPL: 3.2 (CALC)
CO2 SERPL-SCNC: 28 MMOL/L (ref 20–32)
CREAT SERPL-MCNC: 1.13 MG/DL (ref 0.7–1.28)
CREAT UR-MCNC: 163 MG/DL (ref 20–320)
EGFRCR SERPLBLD CKD-EPI 2021: 68 ML/MIN/1.73M2
GLUCOSE SERPL-MCNC: 99 MG/DL (ref 65–99)
HDLC SERPL-MCNC: 49 MG/DL
LDLC SERPL CALC-MCNC: 89 MG/DL (CALC)
MICROALBUMIN UR-MCNC: 11.1 MG/DL
NONHDLC SERPL-MCNC: 109 MG/DL (CALC)
POTASSIUM SERPL-SCNC: 4.5 MMOL/L (ref 3.5–5.3)
PROT SERPL-MCNC: 6.3 G/DL (ref 6.1–8.1)
SODIUM SERPL-SCNC: 141 MMOL/L (ref 135–146)
TRIGL SERPL-MCNC: 104 MG/DL

## 2025-08-14 PROCEDURE — 1036F TOBACCO NON-USER: CPT | Performed by: INTERNAL MEDICINE

## 2025-08-14 PROCEDURE — 99214 OFFICE O/P EST MOD 30 MIN: CPT | Performed by: INTERNAL MEDICINE

## 2025-08-14 PROCEDURE — 1159F MED LIST DOCD IN RCRD: CPT | Performed by: INTERNAL MEDICINE

## 2025-08-14 PROCEDURE — G2211 COMPLEX E/M VISIT ADD ON: HCPCS | Performed by: INTERNAL MEDICINE

## 2025-08-14 PROCEDURE — 1160F RVW MEDS BY RX/DR IN RCRD: CPT | Performed by: INTERNAL MEDICINE

## 2025-08-14 RX ORDER — PRAMIPEXOLE DIHYDROCHLORIDE 0.75 MG/1
0.75 TABLET ORAL NIGHTLY
Qty: 90 EACH | Refills: 3 | Status: SHIPPED | OUTPATIENT
Start: 2025-08-14 | End: 2026-08-14

## 2025-08-14 ASSESSMENT — ENCOUNTER SYMPTOMS
LOSS OF SENSATION IN FEET: 0
OCCASIONAL FEELINGS OF UNSTEADINESS: 0
DEPRESSION: 0

## 2025-09-03 ENCOUNTER — TELEPHONE (OUTPATIENT)
Dept: PRIMARY CARE | Facility: CLINIC | Age: 75
End: 2025-09-03
Payer: MEDICARE

## 2025-09-03 DIAGNOSIS — I48.0 PAROXYSMAL ATRIAL FIBRILLATION (MULTI): ICD-10-CM

## 2025-09-03 DIAGNOSIS — E66.01 CLASS 2 SEVERE OBESITY DUE TO EXCESS CALORIES WITH SERIOUS COMORBIDITY AND BODY MASS INDEX (BMI) OF 36.0 TO 36.9 IN ADULT: ICD-10-CM

## 2025-09-03 DIAGNOSIS — E66.812 CLASS 2 SEVERE OBESITY DUE TO EXCESS CALORIES WITH SERIOUS COMORBIDITY AND BODY MASS INDEX (BMI) OF 36.0 TO 36.9 IN ADULT: ICD-10-CM

## 2025-09-03 DIAGNOSIS — I49.5 SINUS NODE DYSFUNCTION (MULTI): ICD-10-CM

## 2025-09-03 DIAGNOSIS — G25.81 RESTLESS LEGS SYNDROME: ICD-10-CM

## 2025-09-03 DIAGNOSIS — Z86.79 HISTORY OF CEREBROVASCULAR DISEASE: ICD-10-CM

## 2025-09-03 DIAGNOSIS — F51.04 CHRONIC INSOMNIA: ICD-10-CM

## 2025-09-03 RX ORDER — PRAMIPEXOLE DIHYDROCHLORIDE 0.75 MG/1
0.75 TABLET ORAL NIGHTLY
Qty: 90 TABLET | Refills: 3 | Status: SHIPPED | OUTPATIENT
Start: 2025-09-03 | End: 2026-09-03

## 2025-09-03 RX ORDER — TRAZODONE HYDROCHLORIDE 150 MG/1
150 TABLET ORAL NIGHTLY
Qty: 90 TABLET | Refills: 3 | Status: SHIPPED | OUTPATIENT
Start: 2025-09-03 | End: 2026-09-03

## 2025-11-06 ENCOUNTER — APPOINTMENT (OUTPATIENT)
Dept: PHARMACY | Facility: HOSPITAL | Age: 75
End: 2025-11-06
Payer: MEDICARE

## 2026-02-16 ENCOUNTER — APPOINTMENT (OUTPATIENT)
Dept: PRIMARY CARE | Facility: CLINIC | Age: 76
End: 2026-02-16
Payer: MEDICARE